# Patient Record
Sex: FEMALE | Race: WHITE | NOT HISPANIC OR LATINO | Employment: FULL TIME | URBAN - NONMETROPOLITAN AREA
[De-identification: names, ages, dates, MRNs, and addresses within clinical notes are randomized per-mention and may not be internally consistent; named-entity substitution may affect disease eponyms.]

---

## 2020-06-12 ENCOUNTER — OFFICE VISIT (OUTPATIENT)
Dept: FAMILY MEDICINE CLINIC | Facility: CLINIC | Age: 50
End: 2020-06-12

## 2020-06-12 VITALS
BODY MASS INDEX: 34.89 KG/M2 | HEART RATE: 95 BPM | DIASTOLIC BLOOD PRESSURE: 108 MMHG | WEIGHT: 204.4 LBS | HEIGHT: 64 IN | RESPIRATION RATE: 18 BRPM | TEMPERATURE: 98.4 F | SYSTOLIC BLOOD PRESSURE: 154 MMHG | OXYGEN SATURATION: 98 %

## 2020-06-12 DIAGNOSIS — E03.8 OTHER SPECIFIED HYPOTHYROIDISM: ICD-10-CM

## 2020-06-12 DIAGNOSIS — R73.9 HYPERGLYCEMIA: ICD-10-CM

## 2020-06-12 DIAGNOSIS — R92.8 ABNORMALITY OF LEFT BREAST ON SCREENING MAMMOGRAM: ICD-10-CM

## 2020-06-12 DIAGNOSIS — E53.8 VITAMIN B12 DEFICIENCY: ICD-10-CM

## 2020-06-12 DIAGNOSIS — Z12.11 SCREEN FOR COLON CANCER: ICD-10-CM

## 2020-06-12 DIAGNOSIS — E78.2 MIXED HYPERLIPIDEMIA: ICD-10-CM

## 2020-06-12 DIAGNOSIS — I10 ESSENTIAL HYPERTENSION: Primary | ICD-10-CM

## 2020-06-12 DIAGNOSIS — F51.01 PRIMARY INSOMNIA: ICD-10-CM

## 2020-06-12 DIAGNOSIS — E55.9 VITAMIN D DEFICIENCY: ICD-10-CM

## 2020-06-12 PROBLEM — Z72.0 TOBACCO ABUSE: Status: ACTIVE | Noted: 2020-06-12

## 2020-06-12 LAB — HBA1C MFR BLD: 5.4 % (ref 3.5–5.6)

## 2020-06-12 PROCEDURE — 83036 HEMOGLOBIN GLYCOSYLATED A1C: CPT | Performed by: NURSE PRACTITIONER

## 2020-06-12 PROCEDURE — 82607 VITAMIN B-12: CPT | Performed by: NURSE PRACTITIONER

## 2020-06-12 PROCEDURE — 84443 ASSAY THYROID STIM HORMONE: CPT | Performed by: NURSE PRACTITIONER

## 2020-06-12 PROCEDURE — 84439 ASSAY OF FREE THYROXINE: CPT | Performed by: NURSE PRACTITIONER

## 2020-06-12 PROCEDURE — 80053 COMPREHEN METABOLIC PANEL: CPT | Performed by: NURSE PRACTITIONER

## 2020-06-12 PROCEDURE — 82306 VITAMIN D 25 HYDROXY: CPT | Performed by: NURSE PRACTITIONER

## 2020-06-12 PROCEDURE — 85025 COMPLETE CBC W/AUTO DIFF WBC: CPT | Performed by: NURSE PRACTITIONER

## 2020-06-12 PROCEDURE — 80061 LIPID PANEL: CPT | Performed by: NURSE PRACTITIONER

## 2020-06-12 PROCEDURE — 99203 OFFICE O/P NEW LOW 30 MIN: CPT | Performed by: NURSE PRACTITIONER

## 2020-06-12 RX ORDER — LANOLIN ALCOHOL/MO/W.PET/CERES
3 CREAM (GRAM) TOPICAL NIGHTLY
Start: 2020-06-12 | End: 2020-12-16

## 2020-06-12 RX ORDER — LISINOPRIL 10 MG/1
10 TABLET ORAL DAILY
Qty: 90 TABLET | Refills: 0 | Status: SHIPPED | OUTPATIENT
Start: 2020-06-12 | End: 2020-07-24

## 2020-06-12 NOTE — PROGRESS NOTES
Chief Complaint   Patient presents with   • Hypertension     Hx of HTN. Went to Optometrist on Mon and BP reading was 154/108 there and HR was 104. Denies CP    • Establish Care   • Insomnia     Trouble staying asleep. Has not tried anything OTC.        Subjective   Karla Farias is a 49 y.o.  female who presents today for     • Hypertension    Hx of HTN. Went to Optometrist on Mon and BP reading was 154/108 there and HR was 104. Denies CP   • Establish Care  • Insomnia    Trouble staying asleep. Has not tried anything OTC.      Karla Farias  has a past medical history of Hyperlipidemia and Hypertension.    No Known Allergies    Current Outpatient Medications:   •  lisinopril (PRINIVIL,ZESTRIL) 10 MG tablet, Take 1 tablet by mouth Daily., Disp: 90 tablet, Rfl: 0  •  melatonin 3 MG tablet, Take 1 tablet by mouth Every Night., Disp: , Rfl:   Past Medical History:   Diagnosis Date   • Hyperlipidemia    • Hypertension      Past Surgical History:   Procedure Laterality Date   •  SECTION      x2   • LASER ABLATION OF THE CERVIX  2019     Social History     Socioeconomic History   • Marital status:      Spouse name: Not on file   • Number of children: Not on file   • Years of education: Not on file   • Highest education level: Not on file   Tobacco Use   • Smoking status: Current Every Day Smoker     Packs/day: 1.00     Last attempt to quit: 1990     Years since quittin.4   • Smokeless tobacco: Never Used   Substance and Sexual Activity   • Alcohol use: Yes     Alcohol/week: 1.0 standard drinks     Types: 1 Cans of beer per week   • Drug use: Never   • Sexual activity: Defer     Family History   Problem Relation Age of Onset   • Brain cancer Mother    • Lung cancer Father    • Hypertension Father    • Heart failure Father    • Emphysema Father    • Diabetes Sister    • Hypertension Maternal Grandmother    • Hyperlipidemia Maternal Grandmother    • Alzheimer's disease Paternal Grandmother    •  "Heart failure Paternal Grandfather      PHQ-2 Depression Screening  Little interest or pleasure in doing things? 0   Feeling down, depressed, or hopeless? 0   PHQ-2 Total Score 0     PHQ-9 Depression Screening  Little interest or pleasure in doing things? 0   Feeling down, depressed, or hopeless? 0   Trouble falling or staying asleep, or sleeping too much?     Feeling tired or having little energy?     Poor appetite or overeating?     Feeling bad about yourself - or that you are a failure or have let yourself or your family down?     Trouble concentrating on things, such as reading the newspaper or watching television?     Moving or speaking so slowly that other people could have noticed? Or the opposite - being so fidgety or restless that you have been moving around a lot more than usual?     Thoughts that you would be better off dead, or of hurting yourself in some way?     PHQ-9 Total Score 0   If you checked off any problems, how difficult have these problems made it for you to do your work, take care of things at home, or get along with other people?         Family history, surgical history, past medical history, Allergies and med's reviewed with patient today and updated in [facility] EMR.     ROS:  Review of Systems   All other systems reviewed and are negative.      OBJECTIVE:  Vitals:    06/12/20 1032   BP: (!) 154/108   BP Location: Left arm   Patient Position: Sitting   Pulse: 95   Resp: 18   Temp: 98.4 °F (36.9 °C)   TempSrc: Oral   SpO2: 98%   Weight: 92.7 kg (204 lb 6.4 oz)   Height: 161.3 cm (63.5\")     Body mass index is 35.64 kg/m².  Physical Exam   Constitutional: She is oriented to person, place, and time. She appears well-developed and well-nourished.   HENT:   Head: Normocephalic and atraumatic.   Eyes: Pupils are equal, round, and reactive to light. Conjunctivae and EOM are normal.   Neck: Normal range of motion. Neck supple.   Cardiovascular: Normal rate, regular rhythm and normal heart sounds. "   Pulmonary/Chest: Effort normal and breath sounds normal.   Abdominal: Soft. Bowel sounds are normal.   Musculoskeletal: Normal range of motion.   Neurological: She is alert and oriented to person, place, and time.   Skin: Skin is warm and dry.   Psychiatric: She has a normal mood and affect. Her behavior is normal. Judgment and thought content normal.   Nursing note and vitals reviewed.      ASSESSMENT/ PLAN:    Karla was seen today for hypertension, establish care and insomnia.    Diagnoses and all orders for this visit:    Essential hypertension  -     lisinopril (PRINIVIL,ZESTRIL) 10 MG tablet; Take 1 tablet by mouth Daily.    Vitamin D deficiency  -     Vitamin D 25 Hydroxy; Future  -     Vitamin D 25 Hydroxy    Vitamin B12 deficiency  -     Vitamin B12; Future  -     Vitamin B12    Mixed hyperlipidemia  -     Comprehensive Metabolic Panel; Future  -     Lipid Panel; Future  -     Comprehensive Metabolic Panel  -     Lipid Panel    Other specified hypothyroidism  -     TSH; Future  -     T4, Free; Future  -     CBC & Differential; Future  -     TSH  -     T4, Free  -     CBC & Differential  -     CBC Auto Differential    Hyperglycemia  -     Hemoglobin A1c; Future  -     Hemoglobin A1c    Abnormality of left breast on screening mammogram  -     US Breast Left Complete; Future    Screen for colon cancer  -     Amb referral for Screening Colonoscopy    Primary insomnia    Other orders  -     melatonin 3 MG tablet; Take 1 tablet by mouth Every Night.        Orders Placed Today:     New Medications Ordered This Visit   Medications   • lisinopril (PRINIVIL,ZESTRIL) 10 MG tablet     Sig: Take 1 tablet by mouth Daily.     Dispense:  90 tablet     Refill:  0   • melatonin 3 MG tablet     Sig: Take 1 tablet by mouth Every Night.        Management Plan:   Start HTN medications  Use melatonin for insomnia     An After Visit Summary was printed and given to the patient at discharge.    Follow-up: Return in about 2 weeks  (around 6/26/2020), or for HTN.    Natalie Castelan, TOSHIA 6/23/2020 09:42  This note was electronically signed.

## 2020-06-13 LAB
25(OH)D3 SERPL-MCNC: 25 NG/ML (ref 30–100)
ALBUMIN SERPL-MCNC: 4 G/DL (ref 3.5–5.2)
ALBUMIN/GLOB SERPL: 1.2 G/DL
ALP SERPL-CCNC: 43 U/L (ref 39–117)
ALT SERPL W P-5'-P-CCNC: 13 U/L (ref 1–33)
ANION GAP SERPL CALCULATED.3IONS-SCNC: 9.9 MMOL/L (ref 5–15)
AST SERPL-CCNC: 18 U/L (ref 1–32)
BASOPHILS # BLD AUTO: 0.04 10*3/MM3 (ref 0–0.2)
BASOPHILS NFR BLD AUTO: 0.5 % (ref 0–1.5)
BILIRUB SERPL-MCNC: 0.2 MG/DL (ref 0.2–1.2)
BUN BLD-MCNC: 8 MG/DL (ref 6–20)
BUN/CREAT SERPL: 14.5 (ref 7–25)
CALCIUM SPEC-SCNC: 8.6 MG/DL (ref 8.6–10.5)
CHLORIDE SERPL-SCNC: 101 MMOL/L (ref 98–107)
CHOLEST SERPL-MCNC: 210 MG/DL (ref 0–200)
CO2 SERPL-SCNC: 24.1 MMOL/L (ref 22–29)
CREAT BLD-MCNC: 0.55 MG/DL (ref 0.57–1)
DEPRECATED RDW RBC AUTO: 41.2 FL (ref 37–54)
EOSINOPHIL # BLD AUTO: 0.22 10*3/MM3 (ref 0–0.4)
EOSINOPHIL NFR BLD AUTO: 2.7 % (ref 0.3–6.2)
ERYTHROCYTE [DISTWIDTH] IN BLOOD BY AUTOMATED COUNT: 13.2 % (ref 12.3–15.4)
GFR SERPL CREATININE-BSD FRML MDRD: 118 ML/MIN/1.73
GLOBULIN UR ELPH-MCNC: 3.3 GM/DL
GLUCOSE BLD-MCNC: 83 MG/DL (ref 65–99)
HCT VFR BLD AUTO: 43.3 % (ref 34–46.6)
HDLC SERPL-MCNC: 49 MG/DL (ref 40–60)
HGB BLD-MCNC: 14.5 G/DL (ref 12–15.9)
IMM GRANULOCYTES # BLD AUTO: 0.03 10*3/MM3 (ref 0–0.05)
IMM GRANULOCYTES NFR BLD AUTO: 0.4 % (ref 0–0.5)
LDLC SERPL CALC-MCNC: 135 MG/DL (ref 0–100)
LDLC/HDLC SERPL: 2.76 {RATIO}
LYMPHOCYTES # BLD AUTO: 2.39 10*3/MM3 (ref 0.7–3.1)
LYMPHOCYTES NFR BLD AUTO: 29.8 % (ref 19.6–45.3)
MCH RBC QN AUTO: 28.7 PG (ref 26.6–33)
MCHC RBC AUTO-ENTMCNC: 33.5 G/DL (ref 31.5–35.7)
MCV RBC AUTO: 85.6 FL (ref 79–97)
MONOCYTES # BLD AUTO: 0.53 10*3/MM3 (ref 0.1–0.9)
MONOCYTES NFR BLD AUTO: 6.6 % (ref 5–12)
NEUTROPHILS # BLD AUTO: 4.82 10*3/MM3 (ref 1.7–7)
NEUTROPHILS NFR BLD AUTO: 60 % (ref 42.7–76)
NRBC BLD AUTO-RTO: 0 /100 WBC (ref 0–0.2)
PLATELET # BLD AUTO: 246 10*3/MM3 (ref 140–450)
PMV BLD AUTO: 11.2 FL (ref 6–12)
POTASSIUM BLD-SCNC: 4.1 MMOL/L (ref 3.5–5.2)
PROT SERPL-MCNC: 7.3 G/DL (ref 6–8.5)
RBC # BLD AUTO: 5.06 10*6/MM3 (ref 3.77–5.28)
SODIUM BLD-SCNC: 135 MMOL/L (ref 136–145)
T4 FREE SERPL-MCNC: 1.2 NG/DL (ref 0.93–1.7)
TRIGL SERPL-MCNC: 130 MG/DL (ref 0–150)
TSH SERPL DL<=0.05 MIU/L-ACNC: 2.79 UIU/ML (ref 0.27–4.2)
VIT B12 BLD-MCNC: 412 PG/ML (ref 211–946)
VLDLC SERPL-MCNC: 26 MG/DL (ref 5–40)
WBC NRBC COR # BLD: 8.03 10*3/MM3 (ref 3.4–10.8)

## 2020-06-24 ENCOUNTER — HOSPITAL ENCOUNTER (OUTPATIENT)
Dept: ULTRASOUND IMAGING | Facility: HOSPITAL | Age: 50
Discharge: HOME | End: 2020-06-24
Admitting: NURSE PRACTITIONER

## 2020-06-24 ENCOUNTER — APPOINTMENT (OUTPATIENT)
Dept: OTHER | Facility: HOSPITAL | Age: 50
End: 2020-06-24

## 2020-06-24 ENCOUNTER — HOSPITAL ENCOUNTER (OUTPATIENT)
Dept: MAMMOGRAPHY | Facility: HOSPITAL | Age: 50
Discharge: HOME | End: 2020-06-24

## 2020-06-24 DIAGNOSIS — N63.20 LEFT BREAST LUMP: ICD-10-CM

## 2020-06-24 DIAGNOSIS — R92.8 ABNORMALITY OF LEFT BREAST ON SCREENING MAMMOGRAM: ICD-10-CM

## 2020-06-24 DIAGNOSIS — Z00.6 EXAMINATION FOR NORMAL COMPARISON OR CONTROL IN CLINICAL RESEARCH: ICD-10-CM

## 2020-06-24 DIAGNOSIS — L30.9 DERMATITIS: Primary | ICD-10-CM

## 2020-06-24 DIAGNOSIS — N63.20 LEFT BREAST LUMP: Primary | ICD-10-CM

## 2020-06-24 PROCEDURE — 77066 DX MAMMO INCL CAD BI: CPT

## 2020-06-24 PROCEDURE — 76642 ULTRASOUND BREAST LIMITED: CPT

## 2020-06-24 PROCEDURE — G0279 TOMOSYNTHESIS, MAMMO: HCPCS

## 2020-06-26 ENCOUNTER — TELEPHONE (OUTPATIENT)
Dept: FAMILY MEDICINE CLINIC | Facility: CLINIC | Age: 50
End: 2020-06-26

## 2020-06-26 ENCOUNTER — OFFICE VISIT (OUTPATIENT)
Dept: FAMILY MEDICINE CLINIC | Facility: CLINIC | Age: 50
End: 2020-06-26

## 2020-06-26 VITALS
WEIGHT: 204 LBS | TEMPERATURE: 96.8 F | BODY MASS INDEX: 34.83 KG/M2 | SYSTOLIC BLOOD PRESSURE: 153 MMHG | RESPIRATION RATE: 16 BRPM | HEART RATE: 86 BPM | HEIGHT: 64 IN | OXYGEN SATURATION: 97 % | DIASTOLIC BLOOD PRESSURE: 100 MMHG

## 2020-06-26 DIAGNOSIS — R07.89 ATYPICAL CHEST PAIN: ICD-10-CM

## 2020-06-26 DIAGNOSIS — I10 ESSENTIAL HYPERTENSION: Primary | ICD-10-CM

## 2020-06-26 PROCEDURE — 93000 ELECTROCARDIOGRAM COMPLETE: CPT | Performed by: NURSE PRACTITIONER

## 2020-06-26 PROCEDURE — 99213 OFFICE O/P EST LOW 20 MIN: CPT | Performed by: NURSE PRACTITIONER

## 2020-06-26 RX ORDER — HYDROCHLOROTHIAZIDE 12.5 MG/1
12.5 TABLET ORAL DAILY
Qty: 30 TABLET | Refills: 0 | Status: SHIPPED | OUTPATIENT
Start: 2020-06-26 | End: 2020-07-24 | Stop reason: SDUPTHER

## 2020-06-26 NOTE — TELEPHONE ENCOUNTER
----- Message from TOSHIA Flood sent at 6/24/2020 11:21 AM EDT -----  1. Referral to dermatology for evaluation  2. . Recommend attempts at obtaining prior outside mammograms for  comparison. If no prior exams become available for comparison, recommend  for the patient to return for a 6 month follow-up left breast diagnostic  mammogram and possible ultrasound for further evaluation.  2. Also recommend continued clinical management of the patient's  improving skin lesion. The patient may also return sooner for further  evaluation if any new or worsening symptoms develop.

## 2020-07-16 ENCOUNTER — TELEPHONE (OUTPATIENT)
Dept: FAMILY MEDICINE CLINIC | Facility: CLINIC | Age: 50
End: 2020-07-16

## 2020-07-16 DIAGNOSIS — N63.20 LEFT BREAST LUMP: Primary | ICD-10-CM

## 2020-07-16 DIAGNOSIS — R92.8 ABNORMAL MAMMOGRAM OF LEFT BREAST: ICD-10-CM

## 2020-07-16 NOTE — TELEPHONE ENCOUNTER
----- Message from TOSHIA Flood sent at 7/13/2020  9:50 AM EDT -----  1. Recommend 6 month follow-up left breast diagnostic mammogram and  possible ultrasound to ensure continued stability and/or benign  progression.  2. Also recommend continued clinical management of the patient's  improving skin lesion. The patient may also return sooner for further  evaluation if any new or worsening symptoms develop.       Does she have a referral for dermatology?

## 2020-07-16 NOTE — TELEPHONE ENCOUNTER
Called pt. No answer. Per HIPPA, may leave detailed VM. VM left advising her of the above and to contact office with questions, concerns, or worsening of symptoms. She does have a referral to Dermatology in her chart. I believe the issue was that Dr. Pappas's office is no longer accepting any Medicaid patients. Therefore, she was referred to [facility]. Future order for Mammo with US if indicated placed.

## 2020-07-24 ENCOUNTER — OFFICE VISIT (OUTPATIENT)
Dept: FAMILY MEDICINE CLINIC | Facility: CLINIC | Age: 50
End: 2020-07-24

## 2020-07-24 VITALS
HEIGHT: 64 IN | TEMPERATURE: 97.7 F | BODY MASS INDEX: 33.77 KG/M2 | DIASTOLIC BLOOD PRESSURE: 77 MMHG | SYSTOLIC BLOOD PRESSURE: 107 MMHG | WEIGHT: 197.8 LBS | OXYGEN SATURATION: 97 % | RESPIRATION RATE: 16 BRPM | HEART RATE: 90 BPM

## 2020-07-24 DIAGNOSIS — I10 ESSENTIAL HYPERTENSION: ICD-10-CM

## 2020-07-24 PROCEDURE — 99213 OFFICE O/P EST LOW 20 MIN: CPT | Performed by: NURSE PRACTITIONER

## 2020-07-24 RX ORDER — LISINOPRIL 10 MG/1
10 TABLET ORAL NIGHTLY
Qty: 90 TABLET | Refills: 0 | Status: CANCELLED | OUTPATIENT
Start: 2020-07-24

## 2020-07-24 RX ORDER — LISINOPRIL 5 MG/1
5 TABLET ORAL DAILY
Qty: 30 TABLET | Refills: 0 | Status: SHIPPED | OUTPATIENT
Start: 2020-07-24 | End: 2020-12-16 | Stop reason: ALTCHOICE

## 2020-07-24 RX ORDER — HYDROCHLOROTHIAZIDE 12.5 MG/1
12.5 TABLET ORAL DAILY
Qty: 90 TABLET | Refills: 0 | Status: SHIPPED | OUTPATIENT
Start: 2020-07-24 | End: 2020-10-27

## 2020-07-24 NOTE — PROGRESS NOTES
Chief Complaint   Patient presents with   • Hypertension     Has not been checking BP at home. Does not have a machine. Denies CP. Denies s/e to medications.         Subjective   Karla Farias is a 49 y.o.  female who presents today for   Reports feeling better.  No elevated blood pressures  Trial of half of a blood pressure pill due to hypotension response  Otherwise doing well     Hypertension   The current episode started more than 1 month ago. Pertinent negatives include no anxiety, blurred vision, headaches, malaise/fatigue, orthopnea, palpitations, PND or shortness of breath. There are no associated agents to hypertension. Risk factors for coronary artery disease include family history and stress. There are no compliance problems.      Karla Farias  has a past medical history of Hyperlipidemia and Hypertension.  Answers for HPI/ROS submitted by the patient on 2020   Hypertension  What is the primary reason for your visit?: High Blood Pressure    No Known Allergies    Current Outpatient Medications:   •  hydroCHLOROthiazide (HYDRODIURIL) 12.5 MG tablet, Take 1 tablet by mouth Daily., Disp: 90 tablet, Rfl: 0  •  melatonin 3 MG tablet, Take 1 tablet by mouth Every Night. (Patient taking differently: Take 6 mg by mouth Every Night.), Disp: , Rfl:   •  lisinopril (PRINIVIL,ZESTRIL) 5 MG tablet, Take 1 tablet by mouth Daily., Disp: 30 tablet, Rfl: 0  Past Medical History:   Diagnosis Date   • Hyperlipidemia    • Hypertension      Past Surgical History:   Procedure Laterality Date   •  SECTION      x2   • LASER ABLATION OF THE CERVIX  2019     Social History     Socioeconomic History   • Marital status:      Spouse name: Not on file   • Number of children: Not on file   • Years of education: Not on file   • Highest education level: Not on file   Tobacco Use   • Smoking status: Current Every Day Smoker     Packs/day: 1.00     Last attempt to quit:      Years since quittin.5   •  Smokeless tobacco: Never Used   Substance and Sexual Activity   • Alcohol use: Yes     Alcohol/week: 1.0 standard drinks     Types: 1 Cans of beer per week   • Drug use: Never   • Sexual activity: Defer     Family History   Problem Relation Age of Onset   • Brain cancer Mother    • Lung cancer Father    • Hypertension Father    • Heart failure Father    • Emphysema Father    • Diabetes Sister    • Hypertension Maternal Grandmother    • Hyperlipidemia Maternal Grandmother    • Alzheimer's disease Paternal Grandmother    • Heart failure Paternal Grandfather      PHQ-2 Depression Screening  Little interest or pleasure in doing things?     Feeling down, depressed, or hopeless?     PHQ-2 Total Score       PHQ-9 Depression Screening  Little interest or pleasure in doing things?     Feeling down, depressed, or hopeless?     Trouble falling or staying asleep, or sleeping too much?     Feeling tired or having little energy?     Poor appetite or overeating?     Feeling bad about yourself - or that you are a failure or have let yourself or your family down?     Trouble concentrating on things, such as reading the newspaper or watching television?     Moving or speaking so slowly that other people could have noticed? Or the opposite - being so fidgety or restless that you have been moving around a lot more than usual?     Thoughts that you would be better off dead, or of hurting yourself in some way?     PHQ-9 Total Score     If you checked off any problems, how difficult have these problems made it for you to do your work, take care of things at home, or get along with other people?         Family history, surgical history, past medical history, Allergies and med's reviewed with patient today and updated in [facility] EMR.     ROS:  Review of Systems   Constitutional: Negative for malaise/fatigue.   Eyes: Negative for blurred vision.   Respiratory: Negative for shortness of breath.    Cardiovascular: Negative for palpitations,  "orthopnea and PND.   Neurological: Negative for headaches.       OBJECTIVE:  Vitals:    07/24/20 0804   BP: 107/77   BP Location: Left arm   Patient Position: Sitting   Cuff Size: Adult   Pulse: 90   Resp: 16   Temp: 97.7 °F (36.5 °C)   TempSrc: Temporal   SpO2: 97%   Weight: 89.7 kg (197 lb 12.8 oz)   Height: 161.3 cm (63.5\")     Body mass index is 34.49 kg/m².  Physical Exam   Constitutional: She is oriented to person, place, and time. She appears well-developed and well-nourished.   HENT:   Head: Normocephalic and atraumatic.   Eyes: Pupils are equal, round, and reactive to light. Conjunctivae and EOM are normal.   Neck: Normal range of motion. Neck supple.   Cardiovascular: Normal rate, regular rhythm and normal heart sounds.   Pulmonary/Chest: Effort normal and breath sounds normal.   Abdominal: Soft. Bowel sounds are normal.   Musculoskeletal: Normal range of motion.   Neurological: She is alert and oriented to person, place, and time.   Skin: Skin is warm and dry.   Psychiatric: She has a normal mood and affect. Her behavior is normal. Judgment and thought content normal.   Nursing note and vitals reviewed.      ASSESSMENT/ PLAN:    Karla was seen today for hypertension.    Diagnoses and all orders for this visit:    Essential hypertension  -     hydroCHLOROthiazide (HYDRODIURIL) 12.5 MG tablet; Take 1 tablet by mouth Daily.    Other orders  -     lisinopril (PRINIVIL,ZESTRIL) 5 MG tablet; Take 1 tablet by mouth Daily.        Orders Placed Today:     New Medications Ordered This Visit   Medications   • hydroCHLOROthiazide (HYDRODIURIL) 12.5 MG tablet     Sig: Take 1 tablet by mouth Daily.     Dispense:  90 tablet     Refill:  0   • lisinopril (PRINIVIL,ZESTRIL) 5 MG tablet     Sig: Take 1 tablet by mouth Daily.     Dispense:  30 tablet     Refill:  0        Management Plan:   Refill sents  F/U in 4 weeks to evaluated blood pressure     An After Visit Summary was printed and given to the patient at " discharge.    Follow-up: Return in about 4 weeks (around 8/21/2020).    Natalie Castelan, APRN 7/27/2020 09:23  This note was electronically signed.

## 2020-08-26 ENCOUNTER — OFFICE VISIT (OUTPATIENT)
Dept: FAMILY MEDICINE CLINIC | Facility: CLINIC | Age: 50
End: 2020-08-26

## 2020-08-26 VITALS
OXYGEN SATURATION: 97 % | HEART RATE: 87 BPM | SYSTOLIC BLOOD PRESSURE: 109 MMHG | TEMPERATURE: 97.3 F | WEIGHT: 193 LBS | BODY MASS INDEX: 32.95 KG/M2 | HEIGHT: 64 IN | DIASTOLIC BLOOD PRESSURE: 73 MMHG

## 2020-08-26 DIAGNOSIS — I10 ESSENTIAL HYPERTENSION: Primary | ICD-10-CM

## 2020-08-26 PROCEDURE — 99213 OFFICE O/P EST LOW 20 MIN: CPT | Performed by: NURSE PRACTITIONER

## 2020-09-10 RX ORDER — ERGOCALCIFEROL 1.25 MG/1
50000 CAPSULE ORAL
Qty: 8 CAPSULE | Refills: 0 | Status: SHIPPED | OUTPATIENT
Start: 2020-09-10 | End: 2020-10-30

## 2020-10-12 DIAGNOSIS — E55.9 VITAMIN D DEFICIENCY: ICD-10-CM

## 2020-10-12 DIAGNOSIS — I10 ESSENTIAL HYPERTENSION: Primary | ICD-10-CM

## 2020-10-12 DIAGNOSIS — E53.8 VITAMIN B12 DEFICIENCY: ICD-10-CM

## 2020-10-12 DIAGNOSIS — E03.8 OTHER SPECIFIED HYPOTHYROIDISM: ICD-10-CM

## 2020-10-12 DIAGNOSIS — E78.2 MIXED HYPERLIPIDEMIA: ICD-10-CM

## 2020-10-12 DIAGNOSIS — R53.83 OTHER FATIGUE: ICD-10-CM

## 2020-10-12 DIAGNOSIS — R73.9 HYPERGLYCEMIA: ICD-10-CM

## 2020-10-27 DIAGNOSIS — I10 ESSENTIAL HYPERTENSION: ICD-10-CM

## 2020-10-27 RX ORDER — HYDROCHLOROTHIAZIDE 12.5 MG/1
TABLET ORAL
Qty: 90 TABLET | Refills: 0 | Status: SHIPPED | OUTPATIENT
Start: 2020-10-27 | End: 2021-02-16 | Stop reason: SDUPTHER

## 2020-10-27 NOTE — TELEPHONE ENCOUNTER
Date of last refill: 7-    Is there an Inspect on file: n/a    Last appt date: 8-     Next appt date: 12-      Additional information:

## 2020-11-16 ENCOUNTER — TELEPHONE (OUTPATIENT)
Dept: FAMILY MEDICINE CLINIC | Facility: CLINIC | Age: 50
End: 2020-11-16

## 2020-11-16 NOTE — TELEPHONE ENCOUNTER
CALLED PATIENT. NO ANSWER. PER HIPAA, MAY LEAVE VM ADVISING PT TO SEEK APPT WITH EYE DR PER RECOMMENDATION PER NP. ADVISED PT TO CONTACT US WITH ANY OTHER CONCERNS OR QUESTIONS.

## 2020-11-16 NOTE — TELEPHONE ENCOUNTER
Patient called stating that her right eye is swollen and having a slight discharge,  This has been ongoing since Friday 11/13.  She is asking if she should come to see us or go to her eye doctor.

## 2020-11-16 NOTE — TELEPHONE ENCOUNTER
Natalie,   Do you want patient to go see an eye doctor or would you like to see the patient? Please advise. Thank you.    visual

## 2020-12-16 ENCOUNTER — OFFICE VISIT (OUTPATIENT)
Dept: FAMILY MEDICINE CLINIC | Facility: CLINIC | Age: 50
End: 2020-12-16

## 2020-12-16 VITALS
DIASTOLIC BLOOD PRESSURE: 85 MMHG | OXYGEN SATURATION: 97 % | RESPIRATION RATE: 18 BRPM | TEMPERATURE: 97.2 F | WEIGHT: 201.6 LBS | BODY MASS INDEX: 34.42 KG/M2 | SYSTOLIC BLOOD PRESSURE: 123 MMHG | HEART RATE: 95 BPM | HEIGHT: 64 IN

## 2020-12-16 DIAGNOSIS — M54.41 ACUTE BILATERAL LOW BACK PAIN WITH RIGHT-SIDED SCIATICA: Primary | ICD-10-CM

## 2020-12-16 DIAGNOSIS — R20.2 NUMBNESS AND TINGLING OF BOTH LEGS: ICD-10-CM

## 2020-12-16 DIAGNOSIS — R20.0 NUMBNESS AND TINGLING OF BOTH LEGS: ICD-10-CM

## 2020-12-16 PROCEDURE — 99213 OFFICE O/P EST LOW 20 MIN: CPT | Performed by: NURSE PRACTITIONER

## 2020-12-16 RX ORDER — IBUPROFEN 800 MG/1
800 TABLET ORAL EVERY 8 HOURS PRN
Qty: 28 TABLET | Refills: 0 | Status: SHIPPED | OUTPATIENT
Start: 2020-12-16 | End: 2020-12-30 | Stop reason: ALTCHOICE

## 2020-12-16 NOTE — PROGRESS NOTES
"Chief Complaint   Patient presents with   • Back Pain     follow up    • Tingling     associated with numbness in bilateral thighs for a while.         Subjective   Karla Farias is a 50 y.o.  female who presents today for   Pt  Reports bilateral upper leg tingling on an off for \"few months\" it is now constant. She reports that she did see a chiropractor when she was younger that really helped her. Reports as though it always feels like its asleep.     Back Pain  This is a new problem. The current episode started 1 to 4 weeks ago. The problem occurs daily. The problem has been gradually worsening since onset. The pain is present in the gluteal. The quality of the pain is described as aching, burning, shooting and stabbing. The pain radiates to the right foot and right thigh. The pain is at a severity of 8/10. The pain is worse during the day. The symptoms are aggravated by sitting and standing. Stiffness is present at night. Associated symptoms include leg pain, numbness, paresthesias and tingling. Pertinent negatives include no abdominal pain, bladder incontinence, bowel incontinence, chest pain, dysuria, fever, headaches, paresis, pelvic pain, perianal numbness or weakness.     Karla Farias  has a past medical history of Hyperlipidemia and Hypertension.    No Known Allergies    Current Outpatient Medications:   •  hydroCHLOROthiazide (HYDRODIURIL) 12.5 MG tablet, Take 1 tablet by mouth once daily, Disp: 90 tablet, Rfl: 0  •  ibuprofen (ADVIL,MOTRIN) 800 MG tablet, Take 1 tablet by mouth Every 8 (Eight) Hours As Needed for Mild Pain  for up to 14 days., Disp: 28 tablet, Rfl: 0  Past Medical History:   Diagnosis Date   • Hyperlipidemia    • Hypertension      Past Surgical History:   Procedure Laterality Date   •  SECTION      x2   • LASER ABLATION OF THE CERVIX  2019     Social History     Socioeconomic History   • Marital status:      Spouse name: Not on file   • Number of children: Not on " file   • Years of education: Not on file   • Highest education level: Not on file   Tobacco Use   • Smoking status: Current Every Day Smoker     Packs/day: 1.00     Years: 30.00     Pack years: 30.00     Start date: 1990   • Smokeless tobacco: Never Used   Substance and Sexual Activity   • Alcohol use: Yes     Alcohol/week: 1.0 standard drinks     Types: 1 Cans of beer per week   • Drug use: Never   • Sexual activity: Defer     Family History   Problem Relation Age of Onset   • Brain cancer Mother    • Lung cancer Father    • Hypertension Father    • Heart failure Father    • Emphysema Father    • Diabetes Sister    • Hypertension Maternal Grandmother    • Hyperlipidemia Maternal Grandmother    • Alzheimer's disease Paternal Grandmother    • Heart failure Paternal Grandfather      PHQ-2 Depression Screening  Little interest or pleasure in doing things?     Feeling down, depressed, or hopeless?     PHQ-2 Total Score       PHQ-9 Depression Screening  Little interest or pleasure in doing things?     Feeling down, depressed, or hopeless?     Trouble falling or staying asleep, or sleeping too much?     Feeling tired or having little energy?     Poor appetite or overeating?     Feeling bad about yourself - or that you are a failure or have let yourself or your family down?     Trouble concentrating on things, such as reading the newspaper or watching television?     Moving or speaking so slowly that other people could have noticed? Or the opposite - being so fidgety or restless that you have been moving around a lot more than usual?     Thoughts that you would be better off dead, or of hurting yourself in some way?     PHQ-9 Total Score     If you checked off any problems, how difficult have these problems made it for you to do your work, take care of things at home, or get along with other people?         Family history, surgical history, past medical history, Allergies and med's reviewed with patient today and updated  "in [facility] EMR.     ROS:  Review of Systems   Constitutional: Negative for fever.   Cardiovascular: Negative for chest pain.   Gastrointestinal: Negative for abdominal pain and bowel incontinence.   Genitourinary: Negative for bladder incontinence, dysuria and pelvic pain.   Musculoskeletal: Positive for back pain.   Neurological: Positive for tingling, numbness and paresthesias. Negative for weakness and headaches.       OBJECTIVE:  Vitals:    12/16/20 0952   BP: 123/85   BP Location: Left arm   Patient Position: Sitting   Cuff Size: Adult   Pulse: 95   Resp: 18   Temp: 97.2 °F (36.2 °C)   TempSrc: Infrared   SpO2: 97%   Weight: 91.4 kg (201 lb 9.6 oz)   Height: 161.3 cm (63.5\")     Body mass index is 35.15 kg/m².  Physical Exam  Vitals signs and nursing note reviewed.   Constitutional:       Appearance: She is well-developed.   HENT:      Head: Normocephalic and atraumatic.   Eyes:      Conjunctiva/sclera: Conjunctivae normal.      Pupils: Pupils are equal, round, and reactive to light.   Neck:      Musculoskeletal: Normal range of motion and neck supple.   Cardiovascular:      Rate and Rhythm: Normal rate and regular rhythm.      Heart sounds: Normal heart sounds.   Pulmonary:      Effort: Pulmonary effort is normal.      Breath sounds: Normal breath sounds.   Abdominal:      General: Bowel sounds are normal.      Palpations: Abdomen is soft.   Musculoskeletal: Normal range of motion.   Skin:     General: Skin is warm and dry.   Neurological:      Mental Status: She is alert and oriented to person, place, and time.   Psychiatric:         Behavior: Behavior normal.         Thought Content: Thought content normal.         Judgment: Judgment normal.         ASSESSMENT/ PLAN:    Diagnoses and all orders for this visit:    1. Acute bilateral low back pain with right-sided sciatica (Primary)  -     ibuprofen (ADVIL,MOTRIN) 800 MG tablet; Take 1 tablet by mouth Every 8 (Eight) Hours As Needed for Mild Pain  for up to 14 " days.  Dispense: 28 tablet; Refill: 0  -     XR Spine Lumbar 2 or 3 View; Future  -     EMG 2 Limbs; Future    2. Numbness and tingling of both legs        Orders Placed Today:     New Medications Ordered This Visit   Medications   • ibuprofen (ADVIL,MOTRIN) 800 MG tablet     Sig: Take 1 tablet by mouth Every 8 (Eight) Hours As Needed for Mild Pain  for up to 14 days.     Dispense:  28 tablet     Refill:  0        Management Plan:   Needs EMG  Trial of ibuprofen for low back pain  Lumbar spine xray      An After Visit Summary was printed and given to the patient at discharge.    Follow-up: Return in about 2 weeks (around 12/30/2020).    TOSHIA Kennedy 12/17/2020 10:18 EST  This note was electronically signed.      Answers for HPI/ROS submitted by the patient on 12/16/2020   Back pain  What is the primary reason for your visit?: Back Pain

## 2020-12-21 ENCOUNTER — TELEPHONE (OUTPATIENT)
Dept: FAMILY MEDICINE CLINIC | Facility: CLINIC | Age: 50
End: 2020-12-21

## 2020-12-21 ENCOUNTER — HOSPITAL ENCOUNTER (OUTPATIENT)
Dept: MAMMOGRAPHY | Facility: HOSPITAL | Age: 50
Discharge: HOME | End: 2020-12-21

## 2020-12-21 ENCOUNTER — HOSPITAL ENCOUNTER (OUTPATIENT)
Dept: GENERAL RADIOLOGY | Facility: HOSPITAL | Age: 50
Discharge: HOME | End: 2020-12-21

## 2020-12-21 ENCOUNTER — HOSPITAL ENCOUNTER (OUTPATIENT)
Dept: ULTRASOUND IMAGING | Facility: HOSPITAL | Age: 50
Discharge: HOME | End: 2020-12-21

## 2020-12-21 DIAGNOSIS — R92.8 ABNORMAL MAMMOGRAM OF LEFT BREAST: ICD-10-CM

## 2020-12-21 DIAGNOSIS — R20.0 NUMBNESS AND TINGLING OF BOTH LEGS: Primary | ICD-10-CM

## 2020-12-21 DIAGNOSIS — N63.20 BREAST MASS, LEFT: ICD-10-CM

## 2020-12-21 DIAGNOSIS — R20.2 NUMBNESS AND TINGLING OF BOTH LEGS: Primary | ICD-10-CM

## 2020-12-21 DIAGNOSIS — M54.41 ACUTE BILATERAL LOW BACK PAIN WITH RIGHT-SIDED SCIATICA: ICD-10-CM

## 2020-12-21 DIAGNOSIS — N63.20 LEFT BREAST LUMP: ICD-10-CM

## 2020-12-21 PROCEDURE — 77065 DX MAMMO INCL CAD UNI: CPT

## 2020-12-21 PROCEDURE — G0279 TOMOSYNTHESIS, MAMMO: HCPCS

## 2020-12-21 PROCEDURE — 72100 X-RAY EXAM L-S SPINE 2/3 VWS: CPT

## 2020-12-21 RX ORDER — MELOXICAM 7.5 MG/1
7.5 TABLET ORAL DAILY
Qty: 30 TABLET | Refills: 0 | Status: SHIPPED | OUTPATIENT
Start: 2020-12-21 | End: 2021-01-03 | Stop reason: ALTCHOICE

## 2020-12-21 NOTE — PROGRESS NOTES
I SPOKE WITH PATIENT AND SHE VOICED UNDERSTANDING,  SHE DID ASK IF THERE IS SOMETHING THAT SHE MAY TAKE FOR THE PAIN SHE FEELS. IBUPROFEN DOES NOT SEEM TO BE WORKING.

## 2020-12-21 NOTE — TELEPHONE ENCOUNTER
----- Message from TOSHIA Flood sent at 12/21/2020  2:28 PM EST -----  Let her know to stop the Ibuprofen and I sent in Mobic to see if this is better effective  Please update me in two weeks

## 2020-12-30 ENCOUNTER — OFFICE VISIT (OUTPATIENT)
Dept: FAMILY MEDICINE CLINIC | Facility: CLINIC | Age: 50
End: 2020-12-30

## 2020-12-30 VITALS
DIASTOLIC BLOOD PRESSURE: 90 MMHG | OXYGEN SATURATION: 96 % | HEART RATE: 90 BPM | TEMPERATURE: 98 F | BODY MASS INDEX: 34.49 KG/M2 | WEIGHT: 202 LBS | SYSTOLIC BLOOD PRESSURE: 139 MMHG | HEIGHT: 64 IN

## 2020-12-30 DIAGNOSIS — R29.898 WEAKNESS OF BOTH LOWER EXTREMITIES: ICD-10-CM

## 2020-12-30 DIAGNOSIS — R20.0 NUMBNESS AND TINGLING OF BOTH LEGS: Primary | ICD-10-CM

## 2020-12-30 DIAGNOSIS — R20.2 NUMBNESS AND TINGLING OF BOTH LEGS: Primary | ICD-10-CM

## 2020-12-30 PROCEDURE — 99213 OFFICE O/P EST LOW 20 MIN: CPT | Performed by: NURSE PRACTITIONER

## 2021-01-12 ENCOUNTER — PROCEDURE VISIT (OUTPATIENT)
Dept: NEUROLOGY | Facility: CLINIC | Age: 51
End: 2021-01-12

## 2021-01-12 VITALS — TEMPERATURE: 97.1 F

## 2021-01-12 DIAGNOSIS — M54.41 ACUTE BILATERAL LOW BACK PAIN WITH RIGHT-SIDED SCIATICA: ICD-10-CM

## 2021-01-12 PROCEDURE — 95908 NRV CNDJ TST 3-4 STUDIES: CPT | Performed by: PSYCHIATRY & NEUROLOGY

## 2021-01-12 PROCEDURE — 95886 MUSC TEST DONE W/N TEST COMP: CPT | Performed by: PSYCHIATRY & NEUROLOGY

## 2021-01-12 NOTE — PROGRESS NOTES
EMG and Nerve Conduction Studies    The complete report includes the data sheets.     Referring Doctor: Natalie Castelan APRN    History: BLE/Numbness and Tingling of both legs    Results:    1.  The right sural sensory study was essentially normal.    2.  The left peroneal and bilateral tibial motor nerve conduction studies were normal.    3.  EMG of the muscles examined in the bilateral  L3-S1 myotomes were normal.    Impression:    This is a normal EMG nerve conduction study of the lower extremities.  There is no evidence of significant neuropathy affecting the large diameter fast conducting fibers and no signs of neurogenic change in the muscles examined in the L3-S1 myotomes    Joseph Seipel, M.D.

## 2021-01-15 NOTE — PROGRESS NOTES
Please advise her this was an essentially normal exam  Recommend MRI of lumbar spine if negative then thoracic spine

## 2021-01-18 ENCOUNTER — HOSPITAL ENCOUNTER (OUTPATIENT)
Dept: MRI IMAGING | Facility: HOSPITAL | Age: 51
Discharge: HOME | End: 2021-01-18
Admitting: NURSE PRACTITIONER

## 2021-01-18 ENCOUNTER — TELEPHONE (OUTPATIENT)
Dept: FAMILY MEDICINE CLINIC | Facility: CLINIC | Age: 51
End: 2021-01-18

## 2021-01-18 DIAGNOSIS — R20.2 NUMBNESS AND TINGLING OF BOTH LEGS: ICD-10-CM

## 2021-01-18 DIAGNOSIS — M54.41 ACUTE BILATERAL LOW BACK PAIN WITH RIGHT-SIDED SCIATICA: Primary | ICD-10-CM

## 2021-01-18 DIAGNOSIS — R20.0 NUMBNESS AND TINGLING OF BOTH LEGS: ICD-10-CM

## 2021-01-18 PROCEDURE — 72148 MRI LUMBAR SPINE W/O DYE: CPT

## 2021-01-18 NOTE — TELEPHONE ENCOUNTER
CALLED PATIENT. PATIENT'S IDENTITY VERIFIED. ADVISED PT OF EMG AND MRI RESULTS, AS WELL AS NEED/RECOMMENDATAION TO SEE NEUROSURGEON. SHE VOICED UNDERSTANDING. SHE IS AWARE THAT THE NEUROSURGEON PRACTICE WILL CONTACT HER TO SCHEDULE. REFERRAL PLACED.   
The EMG is normal  The MRI is showing degenerative changes she needs referred to neurosurgeon for recommendations    
WHEN I CALLED AND S/W THIS PATIENT RE: HER DAUGHTER'S TEST RESULTS, THIS PATIENT IS WONDERING ABOUT HER EMG RESULTS, AND STATES THAT SHE ALSO HAD HER MRI TODAY AS WELL. SHE IS WANTING RESULTS OF THE EMG AND MRI. PLEASE ADVISE.     THANK YOU.   
Patient information on fall and injury prevention

## 2021-01-27 ENCOUNTER — OFFICE VISIT (OUTPATIENT)
Dept: NEUROSURGERY | Facility: CLINIC | Age: 51
End: 2021-01-27

## 2021-01-27 VITALS
HEIGHT: 62 IN | BODY MASS INDEX: 36.99 KG/M2 | WEIGHT: 201 LBS | DIASTOLIC BLOOD PRESSURE: 101 MMHG | HEART RATE: 96 BPM | SYSTOLIC BLOOD PRESSURE: 151 MMHG

## 2021-01-27 DIAGNOSIS — M53.3 SACRO-ILIAC PAIN: Primary | ICD-10-CM

## 2021-01-27 PROCEDURE — 99203 OFFICE O/P NEW LOW 30 MIN: CPT | Performed by: NEUROLOGICAL SURGERY

## 2021-01-27 RX ORDER — TIZANIDINE 2 MG/1
4 TABLET ORAL EVERY 8 HOURS PRN
Qty: 40 TABLET | Refills: 2 | Status: SHIPPED | OUTPATIENT
Start: 2021-01-27 | End: 2021-08-04 | Stop reason: SDUPTHER

## 2021-01-27 RX ORDER — GABAPENTIN 100 MG/1
100 CAPSULE ORAL 2 TIMES DAILY
Qty: 60 CAPSULE | Refills: 3 | Status: SHIPPED | OUTPATIENT
Start: 2021-01-27 | End: 2021-07-07 | Stop reason: SDUPTHER

## 2021-01-27 NOTE — PROGRESS NOTES
Subjective   History of Present Illness: Karla Farias is a 50 y.o. female who was seen today for back pain and some anterior thigh paresthesias    History Of Present Illness: Rosario is a 50-year-old female who developed problems with back pain particularly over the sacroiliac joints and gluteal area.  She also has numbness and tingling in the anterior thighs that appears to be either an L2 and 3 distribution or along the femoral nerve.  She did undergo MRI scanning lumbar spine that was unremarkable.  Unfortunately sacroiliac joints are not well seen on this.    She has not really tried much other than ibuprofen initially then Mobic but both of them gave her gastric upset and she has not stopped these.    The following portions of the patient's history were reviewed and updated as appropriate: allergies, current medications, past family history, past medical history, past social history, past surgical history, and problem list.    Past Medical History:   Diagnosis Date   • Arthritis    • Hyperlipidemia    • Hypertension         Past Surgical History:   Procedure Laterality Date   •  SECTION      x2   • LASER ABLATION OF THE CERVIX  2019          Current Outpatient Medications:   •  hydroCHLOROthiazide (HYDRODIURIL) 12.5 MG tablet, Take 1 tablet by mouth once daily, Disp: 90 tablet, Rfl: 0     Social History     Socioeconomic History   • Marital status:      Spouse name: Not on file   • Number of children: Not on file   • Years of education: Not on file   • Highest education level: Not on file   Tobacco Use   • Smoking status: Current Every Day Smoker     Packs/day: 1.00     Years: 30.00     Pack years: 30.00     Start date:    • Smokeless tobacco: Never Used   Substance and Sexual Activity   • Alcohol use: Yes     Alcohol/week: 1.0 standard drinks     Types: 1 Cans of beer per week   • Drug use: Never   • Sexual activity: Defer        Family History   Problem Relation Age of Onset   • Brain  "cancer Mother    • Lung cancer Father    • Hypertension Father    • Heart failure Father    • Emphysema Father    • Diabetes Sister    • Hypertension Maternal Grandmother    • Hyperlipidemia Maternal Grandmother    • Alzheimer's disease Paternal Grandmother    • Heart failure Paternal Grandfather         Review of Systems   Constitutional: Negative.    Musculoskeletal: Positive for back pain and myalgias.   Neurological: Positive for numbness.       Objective     Vitals:    01/27/21 1035   BP: (!) 151/101   BP Location: Left arm   Patient Position: Sitting   Cuff Size: Adult   Pulse: 96   Weight: 91.2 kg (201 lb)   Height: 157.5 cm (62\")     Body mass index is 36.76 kg/m².      Physical Exam  Neurological:      Mental Status: She is oriented to person, place, and time.      Motor: Motor function is intact.      Coordination: Coordination is intact.      Gait: Gait is intact.      Deep Tendon Reflexes:      Reflex Scores:       Patellar reflexes are 2+ on the right side and 2+ on the left side.       Achilles reflexes are 1+ on the right side and 1+ on the left side.     Comments: Geo's test is positive on the right and questionably positive on the left.  Knee thrust also reproduces pain greater on the right than the left.   Psychiatric:         Speech: Speech normal.       Neurologic Exam     Mental Status   Oriented to person, place, and time.   Attention: normal. Concentration: normal.   Speech: speech is normal   Level of consciousness: alert  Knowledge: good.     Gait, Coordination, and Reflexes     Gait  Gait: normal    Reflexes   Right patellar: 2+  Left patellar: 2+  Right achilles: 1+  Left achilles: 1+          Assessment/Plan   Independent Review of Radiographic Studies:      I personally reviewed the images from the following studies.    Lumbar MRI scan dated January 18, 2021.    Medical Decision Making:      After reviewing the scan and her symptoms I think she probably has sacroiliac dysfunction " causing the back pain.  She is can start a physical therapy course for that and we will put her on tizanidine as a muscle relaxer and Neurontin for the tingling paresthesias in the anterior thigh.  She does seem to be somewhat intolerant of the anti-inflammatory medications.  She will return in 4 to 6 weeks for follow-up.  There are no diagnoses linked to this encounter.  No follow-ups on file.

## 2021-02-16 DIAGNOSIS — I10 ESSENTIAL HYPERTENSION: ICD-10-CM

## 2021-02-16 RX ORDER — HYDROCHLOROTHIAZIDE 12.5 MG/1
12.5 TABLET ORAL DAILY
Qty: 90 TABLET | Refills: 0 | Status: SHIPPED | OUTPATIENT
Start: 2021-02-16 | End: 2021-06-16

## 2021-02-16 NOTE — TELEPHONE ENCOUNTER
Date of last refill:10/20/2020    Is there an Inspect on file:N/A    Last appt date:12/30/2020     Next appt date:N/A      Additional information:

## 2021-02-19 ENCOUNTER — TREATMENT (OUTPATIENT)
Dept: PHYSICAL THERAPY | Facility: CLINIC | Age: 51
End: 2021-02-19

## 2021-02-19 DIAGNOSIS — M53.3 SACROILIAC JOINT DYSFUNCTION: Primary | ICD-10-CM

## 2021-02-19 PROCEDURE — 97162 PT EVAL MOD COMPLEX 30 MIN: CPT | Performed by: PHYSICAL THERAPIST

## 2021-02-19 PROCEDURE — 97110 THERAPEUTIC EXERCISES: CPT | Performed by: PHYSICAL THERAPIST

## 2021-02-19 NOTE — PROGRESS NOTES
Physical Therapy Initial Evaluation and Plan of Care    Patient: Karla Farias   : 1970  Diagnosis/ICD-10 Code:  Sacroiliac joint dysfunction [M53.3]  Referring practitioner: Uriah Melissa MD  Date of Initial Visit: 2021  Today's Date: 2021  Patient seen for 1 sessions           Subjective Questionnaire: Oswestry: 24/50 ( 48%)        Subjective Evaluation    History of Present Illness  Mechanism of injury: Pt is referred to therapy with Dx of Sacroiliac joint dysfunction. Pt reports she has been having back pain for a long time but it has been worse in the past few months. Co R sided pain which radiates to the L side /across the LB and sometimes pain and tightness ant thighs. Occasional tingling sensation in R side and ant thighs.   Inc pain with prolonged sitting/ standing/ bending over. It wakes her up at night if sleep on L side. Has to move frequently.   She has been off work since Monday and she feels somewhat better. 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      Quality of life: good    Pain  Current pain ratin  At best pain ratin  At worst pain rating: 10  Quality: throbbing, tight and pulling  Relieving factors: change in position and medications  Aggravating factors: lifting, prolonged positioning, ambulation, standing, movement, sleeping and repetitive movement  Progression: worsening    Social Support  Lives with: spouse    Treatments  Current treatment: medication  Patient Goals  Patient goals for therapy: decreased pain and increased motion           Precautions:     Objective          Postural Observations  Seated posture: fair  Standing posture: fair  Correction of posture: has no consistent effect        Tenderness     Right Hip   Tenderness in the PSIS.     Active Range of Motion     Additional Active Range of Motion Details  Standing lumbar flex: wfl, inc pain R side and across LB, better  with reps  Standing lumbar ext: wfl , no inc pain , no change with reps, co  mild tingling sensation ant upper thighs  Supine flex: wfl,  Inc pain R side initially but got better with reps  Prone ext: wfl limitation, inc pain in LB and tingling sensation ant upper thighs with reps    LEs strength: 4/5 for all ms groups    Supine Manual traction: no change , but R LE long axial traction makes symptoms better    SLR: pos for pain in LB and legs B           Assessment & Plan     Assessment  Impairments: abnormal or restricted ROM, activity intolerance, lacks appropriate home exercise program and pain with function  Assessment details: Pt is a 49 y/o f referred to therapy due to R sided LBP. Pt presents with impaired spinal mobility, TTP R SIJ, tingling sensation ant upper thighs and dec tolerance to standing/ sitting / and disturbed sleep. Upon initial evaluation pt exhibits the above impairments and functional limitations. Impairments affect sleeping, driving and performing daily activities. Pt will benefit from skilled physical therapy to address impairments, resolve pain and maximize function.   Functional Limitations: lifting, sleeping, walking, uncomfortable because of pain, sitting, standing and unable to perform repetitive tasks  Goals  Plan Goals: STGs:  In 3 weeks  1- Pt will  report at least 35 % improvement and pain reduction   2- Pt will be independent with initial HEP   3- Pt will tolerate progression of HEP and her exercise program     LTGs: By DC   1- Pt will report at least 75 % improvement and pain reduction   2- Pt will be independent with final HEP and self management of her condition   3- Pt will improve Oswestry score compare to initial score at eval indicating functional improvement   4- Pt will voice readiness for DC with independent program   5- Pt will demonstrate improved posture and body mechanics    Plan  Therapy options: will be seen for skilled physical therapy services  Planned modality interventions: high voltage pulsed current (pain management) and  ultrasound  Planned therapy interventions: abdominal trunk stabilization, body mechanics training, functional ROM exercises, home exercise program, manual therapy, neuromuscular re-education, postural training, strengthening and therapeutic activities  Frequency: 2x week  Treatment plan discussed with: patient  Plan details: 20 visits        See flow sheet for treatment detail    History # of Personal Factors and/or Comorbidities: MODERATE (1-2)  Examination of Body System(s): # of elements: MODERATE (3)  Clinical Presentation: EVOLVING  Clinical Decision Making: MODERATE          Timed:         Manual Therapy:         mins  49588;     Therapeutic Exercise:    10     mins  89350;     Neuromuscular Arturo:        mins  16994;    Therapeutic Activity:    10      mins  13165;     Gait Training:           mins  29237;     Ultrasound:          mins  86762;    Ionto                                   mins   64588  Self Care                            mins   40194  Canal repositioning           mins    24286      Un-Timed:  Electrical Stimulation:         mins  79079 ( );  Dry Needling          mins self-pay  Traction          mins 42978  Low Eval          Mins  46928  Mod Eval      25    Mins  46812  High Eval                            Mins  94833  Re-Eval                               mins  72032        Timed Treatment:  20    mins   Total Treatment:    45    mins    PT SIGNATURE: Davidson Palencia PT, CLT   DATE TREATMENT INITIATED: 2/19/2021    Initial Certification  Certification Period: 5/20/2021  I certify that the therapy services are furnished while this patient is under my care.  The services outlined above are required by this patient, and will be reviewed every 90 days.     PHYSICIAN: Uriah Melissa MD      DATE:     Please sign and return via fax to [phone].. Thank you, [facility].

## 2021-04-20 ENCOUNTER — DOCUMENTATION (OUTPATIENT)
Dept: PHYSICAL THERAPY | Facility: CLINIC | Age: 51
End: 2021-04-20

## 2021-04-20 NOTE — PROGRESS NOTES
Discharge Summary  Discharge Summary from Physical/Occupational Therapy Report    Patient: Karla Farias   : 1970  Today's Date: 2021    Patient seen for 1 visit  Dates of Service: 21      Comments : Pt was evaluated but has not returned for additional therapy and will be DC from our services.       Thank you for this referral to [facility].    SIGNATURE: Davidson Palencia PT

## 2021-05-25 ENCOUNTER — HOSPITAL ENCOUNTER (OUTPATIENT)
Dept: MAMMOGRAPHY | Facility: HOSPITAL | Age: 51
Discharge: HOME | End: 2021-05-25

## 2021-05-25 ENCOUNTER — HOSPITAL ENCOUNTER (OUTPATIENT)
Dept: ULTRASOUND IMAGING | Facility: HOSPITAL | Age: 51
End: 2021-05-25

## 2021-05-25 DIAGNOSIS — N63.20 BREAST MASS, LEFT: ICD-10-CM

## 2021-06-15 DIAGNOSIS — I10 ESSENTIAL HYPERTENSION: ICD-10-CM

## 2021-06-16 RX ORDER — HYDROCHLOROTHIAZIDE 12.5 MG/1
TABLET ORAL
Qty: 30 TABLET | Refills: 0 | Status: SHIPPED | OUTPATIENT
Start: 2021-06-16 | End: 2021-08-05

## 2021-06-16 NOTE — TELEPHONE ENCOUNTER
Date of last refill: 2-    Is there an Inspect on file: N/A     Last appt date: 12-     Next appt date: 06/22/2021      Additional information:  CINDY MENDOZA

## 2021-06-22 ENCOUNTER — HOSPITAL ENCOUNTER (OUTPATIENT)
Dept: MAMMOGRAPHY | Facility: HOSPITAL | Age: 51
Discharge: HOME | End: 2021-06-22
Admitting: NURSE PRACTITIONER

## 2021-06-22 ENCOUNTER — HOSPITAL ENCOUNTER (OUTPATIENT)
Dept: ULTRASOUND IMAGING | Facility: HOSPITAL | Age: 51
End: 2021-06-22

## 2021-06-22 DIAGNOSIS — N63.20 BREAST MASS, LEFT: ICD-10-CM

## 2021-06-22 PROCEDURE — G0279 TOMOSYNTHESIS, MAMMO: HCPCS

## 2021-06-22 PROCEDURE — 77066 DX MAMMO INCL CAD BI: CPT

## 2021-06-23 ENCOUNTER — TELEPHONE (OUTPATIENT)
Dept: FAMILY MEDICINE CLINIC | Facility: CLINIC | Age: 51
End: 2021-06-23

## 2021-06-23 ENCOUNTER — OFFICE VISIT (OUTPATIENT)
Dept: FAMILY MEDICINE CLINIC | Facility: CLINIC | Age: 51
End: 2021-06-23

## 2021-06-23 VITALS
BODY MASS INDEX: 36.66 KG/M2 | HEART RATE: 80 BPM | WEIGHT: 199.2 LBS | TEMPERATURE: 97.5 F | DIASTOLIC BLOOD PRESSURE: 85 MMHG | SYSTOLIC BLOOD PRESSURE: 123 MMHG | HEIGHT: 62 IN | RESPIRATION RATE: 16 BRPM | OXYGEN SATURATION: 97 %

## 2021-06-23 DIAGNOSIS — M79.662 PAIN IN LEFT LOWER LEG: Primary | ICD-10-CM

## 2021-06-23 DIAGNOSIS — I73.9 INTERMITTENT CLAUDICATION (HCC): ICD-10-CM

## 2021-06-23 PROCEDURE — 99213 OFFICE O/P EST LOW 20 MIN: CPT | Performed by: NURSE PRACTITIONER

## 2021-06-23 NOTE — PROGRESS NOTES
Chief Complaint   Patient presents with   • Hypertension   • Leg Pain     (L) SIDE ANTERIOR AND LATERAL THIGH PAIN - FEELS LIKE A STABBING PAIN - HAS BEEN WAKING HER UP IN THE MIDDLE OF THE NIGHT.         Subjective   Karla Farias is a 50 y.o.  female who presents today for   • Hypertension stable   • Leg Pain    (L) SIDE ANTERIOR AND LATERAL THIGH PAIN - FEELS LIKE A STABBING PAIN - HAS BEEN WAKING HER UP IN THE MIDDLE OF THE NIGHT. Recommend xray due to edema and doppler studies       Karla Farias  has a past medical history of Arthritis, Hyperlipidemia, and Hypertension.    No Known Allergies    Current Outpatient Medications:   •  gabapentin (NEURONTIN) 100 MG capsule, Take 1 capsule by mouth 2 (two) times a day., Disp: 60 capsule, Rfl: 3  •  hydroCHLOROthiazide (HYDRODIURIL) 12.5 MG tablet, Take 1 tablet by mouth once daily, Disp: 30 tablet, Rfl: 0  •  tiZANidine (ZANAFLEX) 2 MG tablet, Take 2 tablets by mouth Every 8 (Eight) Hours As Needed for Muscle Spasms., Disp: 40 tablet, Rfl: 2  Past Medical History:   Diagnosis Date   • Arthritis    • Hyperlipidemia    • Hypertension      Past Surgical History:   Procedure Laterality Date   •  SECTION      x2   • LASER ABLATION OF THE CERVIX  2019     Social History     Socioeconomic History   • Marital status:      Spouse name: Not on file   • Number of children: Not on file   • Years of education: Not on file   • Highest education level: Not on file   Tobacco Use   • Smoking status: Current Every Day Smoker     Packs/day: 1.00     Years: 30.00     Pack years: 30.00     Start date:    • Smokeless tobacco: Never Used   Substance and Sexual Activity   • Alcohol use: Yes     Alcohol/week: 1.0 standard drinks     Types: 1 Cans of beer per week   • Drug use: Never   • Sexual activity: Defer     Family History   Problem Relation Age of Onset   • Brain cancer Mother    • Lung cancer Father    • Hypertension Father    • Heart failure Father    •  Emphysema Father    • Diabetes Sister    • Hypertension Maternal Grandmother    • Hyperlipidemia Maternal Grandmother    • Alzheimer's disease Paternal Grandmother    • Heart failure Paternal Grandfather      PHQ-2 Depression Screening  Little interest or pleasure in doing things? 0   Feeling down, depressed, or hopeless? 0   PHQ-2 Total Score 0     PHQ-9 Depression Screening  Little interest or pleasure in doing things? 0   Feeling down, depressed, or hopeless? 0   Trouble falling or staying asleep, or sleeping too much?     Feeling tired or having little energy?     Poor appetite or overeating?     Feeling bad about yourself - or that you are a failure or have let yourself or your family down?     Trouble concentrating on things, such as reading the newspaper or watching television?     Moving or speaking so slowly that other people could have noticed? Or the opposite - being so fidgety or restless that you have been moving around a lot more than usual?     Thoughts that you would be better off dead, or of hurting yourself in some way?     PHQ-9 Total Score 0   If you checked off any problems, how difficult have these problems made it for you to do your work, take care of things at home, or get along with other people?         Family history, surgical history, past medical history, Allergies and med's reviewed with patient today and updated in [facility] EMR.     ROS:  Review of Systems   Constitutional: Negative for activity change, appetite change and fatigue.   Respiratory: Negative for cough and shortness of breath.    Cardiovascular: Negative for chest pain and leg swelling.   Gastrointestinal: Negative for abdominal pain and nausea.   Endocrine: Negative for polydipsia, polyphagia and polyuria.   Musculoskeletal: Negative for arthralgias, back pain and myalgias.   Skin: Negative for rash.   Neurological: Negative for speech difficulty and headaches.   Psychiatric/Behavioral: Negative for confusion and decreased  "concentration.   All other systems reviewed and are negative.      OBJECTIVE:  Vitals:    06/23/21 1005   BP: 123/85   BP Location: Left arm   Patient Position: Sitting   Cuff Size: Adult   Pulse: 80   Resp: 16   Temp: 97.5 °F (36.4 °C)   TempSrc: Infrared   SpO2: 97%   Weight: 90.4 kg (199 lb 3.2 oz)   Height: 157.5 cm (62\")     Body mass index is 36.43 kg/m².  Physical Exam  Vitals and nursing note reviewed.   Constitutional:       Appearance: Normal appearance. She is well-developed.   HENT:      Head: Normocephalic and atraumatic.   Eyes:      Conjunctiva/sclera: Conjunctivae normal.      Pupils: Pupils are equal, round, and reactive to light.   Cardiovascular:      Rate and Rhythm: Normal rate and regular rhythm.      Heart sounds: Normal heart sounds.   Pulmonary:      Effort: Pulmonary effort is normal.      Breath sounds: Normal breath sounds.   Abdominal:      General: Bowel sounds are normal.      Palpations: Abdomen is soft.   Musculoskeletal:         General: Normal range of motion.      Cervical back: Normal range of motion and neck supple.   Skin:     General: Skin is warm and dry.   Neurological:      Mental Status: She is alert and oriented to person, place, and time.   Psychiatric:         Behavior: Behavior normal.         Thought Content: Thought content normal.         Judgment: Judgment normal.         ASSESSMENT/ PLAN:    Diagnoses and all orders for this visit:    1. Pain in left lower leg (Primary)  -     XR Tibia & Fibula 1 View Left; Future  -     XR Ankle 3+ View Left; Future  -     Duplex Venous Lower Extremity - Left CAR; Future    2. Intermittent claudication (CMS/[facility])        Orders Placed Today:     No orders of the defined types were placed in this encounter.       Management Plan:   Xray left lower leg  Doppler study    An After Visit Summary was printed and given to the patient at discharge.    Follow-up: Return in about 2 weeks (around 7/7/2021).    Natalie Castelan, APRN 6/23/2021 " 11:14 EDT  This note was electronically signed.

## 2021-06-23 NOTE — TELEPHONE ENCOUNTER
----- Message from TOSHIA Flood sent at 6/23/2021 10:20 AM EDT -----  IMPRESSION:  No mammographic evidence of malignancy is demonstrated within either  breast. No significant change compared to prior exams.

## 2021-07-07 ENCOUNTER — OFFICE VISIT (OUTPATIENT)
Dept: FAMILY MEDICINE CLINIC | Facility: CLINIC | Age: 51
End: 2021-07-07

## 2021-07-07 ENCOUNTER — HOSPITAL ENCOUNTER (OUTPATIENT)
Dept: CARDIOLOGY | Facility: HOSPITAL | Age: 51
Discharge: HOME | End: 2021-07-07
Admitting: NURSE PRACTITIONER

## 2021-07-07 VITALS
HEIGHT: 62 IN | TEMPERATURE: 98.4 F | WEIGHT: 200 LBS | HEART RATE: 78 BPM | DIASTOLIC BLOOD PRESSURE: 87 MMHG | BODY MASS INDEX: 36.8 KG/M2 | SYSTOLIC BLOOD PRESSURE: 131 MMHG | OXYGEN SATURATION: 96 %

## 2021-07-07 DIAGNOSIS — M79.662 PAIN IN LEFT LOWER LEG: ICD-10-CM

## 2021-07-07 DIAGNOSIS — I10 ESSENTIAL HYPERTENSION: Primary | ICD-10-CM

## 2021-07-07 DIAGNOSIS — R73.9 HYPERGLYCEMIA: ICD-10-CM

## 2021-07-07 DIAGNOSIS — E55.9 VITAMIN D DEFICIENCY: ICD-10-CM

## 2021-07-07 DIAGNOSIS — E53.8 VITAMIN B12 DEFICIENCY: ICD-10-CM

## 2021-07-07 DIAGNOSIS — E78.2 MIXED HYPERLIPIDEMIA: ICD-10-CM

## 2021-07-07 DIAGNOSIS — R53.83 OTHER FATIGUE: ICD-10-CM

## 2021-07-07 LAB
BH CV LOWER VASCULAR LEFT COMMON FEMORAL AUGMENT: NORMAL
BH CV LOWER VASCULAR LEFT COMMON FEMORAL COMPETENT: NORMAL
BH CV LOWER VASCULAR LEFT COMMON FEMORAL COMPRESS: NORMAL
BH CV LOWER VASCULAR LEFT COMMON FEMORAL PHASIC: NORMAL
BH CV LOWER VASCULAR LEFT COMMON FEMORAL SPONT: NORMAL
BH CV LOWER VASCULAR LEFT DISTAL FEMORAL COMPRESS: NORMAL
BH CV LOWER VASCULAR LEFT GASTRONEMIUS COMPRESS: NORMAL
BH CV LOWER VASCULAR LEFT GREATER SAPH AK COMPRESS: NORMAL
BH CV LOWER VASCULAR LEFT GREATER SAPH BK COMPRESS: NORMAL
BH CV LOWER VASCULAR LEFT LESSER SAPH COMPRESS: NORMAL
BH CV LOWER VASCULAR LEFT MID FEMORAL AUGMENT: NORMAL
BH CV LOWER VASCULAR LEFT MID FEMORAL COMPETENT: NORMAL
BH CV LOWER VASCULAR LEFT MID FEMORAL COMPRESS: NORMAL
BH CV LOWER VASCULAR LEFT MID FEMORAL PHASIC: NORMAL
BH CV LOWER VASCULAR LEFT MID FEMORAL SPONT: NORMAL
BH CV LOWER VASCULAR LEFT PERONEAL COMPRESS: NORMAL
BH CV LOWER VASCULAR LEFT POPLITEAL AUGMENT: NORMAL
BH CV LOWER VASCULAR LEFT POPLITEAL COMPETENT: NORMAL
BH CV LOWER VASCULAR LEFT POPLITEAL COMPRESS: NORMAL
BH CV LOWER VASCULAR LEFT POPLITEAL PHASIC: NORMAL
BH CV LOWER VASCULAR LEFT POPLITEAL SPONT: NORMAL
BH CV LOWER VASCULAR LEFT POSTERIOR TIBIAL COMPRESS: NORMAL
BH CV LOWER VASCULAR LEFT PROFUNDA FEMORAL COMPRESS: NORMAL
BH CV LOWER VASCULAR LEFT PROXIMAL FEMORAL COMPRESS: NORMAL
BH CV LOWER VASCULAR LEFT SAPHENOFEMORAL JUNCTION COMPRESS: NORMAL
BH CV LOWER VASCULAR RIGHT COMMON FEMORAL AUGMENT: NORMAL
BH CV LOWER VASCULAR RIGHT COMMON FEMORAL COMPETENT: NORMAL
BH CV LOWER VASCULAR RIGHT COMMON FEMORAL COMPRESS: NORMAL
BH CV LOWER VASCULAR RIGHT COMMON FEMORAL PHASIC: NORMAL
BH CV LOWER VASCULAR RIGHT COMMON FEMORAL SPONT: NORMAL
MAXIMAL PREDICTED HEART RATE: 172 BPM
STRESS TARGET HR: 146 BPM

## 2021-07-07 PROCEDURE — 99213 OFFICE O/P EST LOW 20 MIN: CPT | Performed by: NURSE PRACTITIONER

## 2021-07-07 PROCEDURE — 80050 GENERAL HEALTH PANEL: CPT | Performed by: NURSE PRACTITIONER

## 2021-07-07 PROCEDURE — 82306 VITAMIN D 25 HYDROXY: CPT | Performed by: NURSE PRACTITIONER

## 2021-07-07 PROCEDURE — 80061 LIPID PANEL: CPT | Performed by: NURSE PRACTITIONER

## 2021-07-07 PROCEDURE — 84439 ASSAY OF FREE THYROXINE: CPT | Performed by: NURSE PRACTITIONER

## 2021-07-07 PROCEDURE — 83036 HEMOGLOBIN GLYCOSYLATED A1C: CPT | Performed by: NURSE PRACTITIONER

## 2021-07-07 PROCEDURE — 82607 VITAMIN B-12: CPT | Performed by: NURSE PRACTITIONER

## 2021-07-07 PROCEDURE — 93971 EXTREMITY STUDY: CPT

## 2021-07-07 RX ORDER — GABAPENTIN 100 MG/1
100 CAPSULE ORAL 2 TIMES DAILY
Qty: 60 CAPSULE | Refills: 3 | Status: SHIPPED | OUTPATIENT
Start: 2021-07-07 | End: 2021-08-04

## 2021-07-07 RX ORDER — ERGOCALCIFEROL 1.25 MG/1
50000 CAPSULE ORAL
Qty: 8 CAPSULE | Refills: 0 | Status: SHIPPED | OUTPATIENT
Start: 2021-07-07 | End: 2021-08-26

## 2021-07-07 NOTE — PROGRESS NOTES
Chief Complaint   Patient presents with   • Hypertension   • Leg Pain   • Follow-up        Subjective   Karla Farias is a 50 y.o.  female who presents today for   • Hypertension stable   • Leg Pain still having issues daily awaiting testing neuropathy of unknown origin   • Follow-up      Karla Farias  has a past medical history of Arthritis, Hyperlipidemia, and Hypertension.    No Known Allergies    Current Outpatient Medications:   •  gabapentin (NEURONTIN) 100 MG capsule, Take 1 capsule by mouth 2 (two) times a day., Disp: 60 capsule, Rfl: 3  •  hydroCHLOROthiazide (HYDRODIURIL) 12.5 MG tablet, Take 1 tablet by mouth once daily, Disp: 30 tablet, Rfl: 0  •  tiZANidine (ZANAFLEX) 2 MG tablet, Take 2 tablets by mouth Every 8 (Eight) Hours As Needed for Muscle Spasms., Disp: 40 tablet, Rfl: 2  •  vitamin D (ERGOCALCIFEROL) 1.25 MG (19514 UT) capsule capsule, Take 1 capsule by mouth Every 7 (Seven) Days for 8 doses., Disp: 8 capsule, Rfl: 0  Past Medical History:   Diagnosis Date   • Arthritis    • Hyperlipidemia    • Hypertension      Past Surgical History:   Procedure Laterality Date   •  SECTION      x2   • LASER ABLATION OF THE CERVIX  2019     Social History     Socioeconomic History   • Marital status:      Spouse name: Not on file   • Number of children: Not on file   • Years of education: Not on file   • Highest education level: Not on file   Tobacco Use   • Smoking status: Current Every Day Smoker     Packs/day: 1.00     Years: 30.00     Pack years: 30.00     Start date:    • Smokeless tobacco: Never Used   Substance and Sexual Activity   • Alcohol use: Yes     Alcohol/week: 1.0 standard drinks     Types: 1 Cans of beer per week   • Drug use: Never   • Sexual activity: Defer     Family History   Problem Relation Age of Onset   • Brain cancer Mother    • Lung cancer Father    • Hypertension Father    • Heart failure Father    • Emphysema Father    • Diabetes Sister    •  "Hypertension Maternal Grandmother    • Hyperlipidemia Maternal Grandmother    • Alzheimer's disease Paternal Grandmother    • Heart failure Paternal Grandfather      PHQ-2 Depression Screening  Little interest or pleasure in doing things?     Feeling down, depressed, or hopeless?     PHQ-2 Total Score       PHQ-9 Depression Screening  Little interest or pleasure in doing things?     Feeling down, depressed, or hopeless?     Trouble falling or staying asleep, or sleeping too much?     Feeling tired or having little energy?     Poor appetite or overeating?     Feeling bad about yourself - or that you are a failure or have let yourself or your family down?     Trouble concentrating on things, such as reading the newspaper or watching television?     Moving or speaking so slowly that other people could have noticed? Or the opposite - being so fidgety or restless that you have been moving around a lot more than usual?     Thoughts that you would be better off dead, or of hurting yourself in some way?     PHQ-9 Total Score     If you checked off any problems, how difficult have these problems made it for you to do your work, take care of things at home, or get along with other people?         Family history, surgical history, past medical history, Allergies and med's reviewed with patient today and updated in [employer] EMR.     ROS:  Review of Systems   Neurological: Positive for numbness.   All other systems reviewed and are negative.      OBJECTIVE:  Vitals:    07/07/21 0845   BP: 131/87   Pulse: 78   Temp: 98.4 °F (36.9 °C)   TempSrc: Infrared   SpO2: 96%   Weight: 90.7 kg (200 lb)   Height: 157.5 cm (62\")     Body mass index is 36.58 kg/m².  Physical Exam  Vitals and nursing note reviewed.   Constitutional:       Appearance: She is well-developed.   HENT:      Head: Normocephalic and atraumatic.   Eyes:      Conjunctiva/sclera: Conjunctivae normal.      Pupils: Pupils are equal, round, and reactive to light. "   Cardiovascular:      Rate and Rhythm: Normal rate and regular rhythm.      Heart sounds: Normal heart sounds.   Pulmonary:      Effort: Pulmonary effort is normal.      Breath sounds: Normal breath sounds.   Abdominal:      General: Bowel sounds are normal.      Palpations: Abdomen is soft.   Musculoskeletal:         General: Normal range of motion.      Cervical back: Normal range of motion and neck supple.   Skin:     General: Skin is warm and dry.   Neurological:      Mental Status: She is alert and oriented to person, place, and time.   Psychiatric:         Behavior: Behavior normal.         Thought Content: Thought content normal.         Judgment: Judgment normal.         ASSESSMENT/ PLAN:    Diagnoses and all orders for this visit:    1. Essential hypertension (Primary)    2. Vitamin D deficiency  -     Vitamin D 25 Hydroxy; Future  -     Vitamin D 25 Hydroxy    3. Vitamin B12 deficiency  -     Vitamin B12; Future  -     Vitamin B12    4. Mixed hyperlipidemia  -     Comprehensive Metabolic Panel; Future  -     Lipid Panel; Future  -     Comprehensive Metabolic Panel  -     Lipid Panel    5. Hyperglycemia  -     Hemoglobin A1c; Future  -     Hemoglobin A1c    6. Other fatigue  -     TSH; Future  -     T4, Free; Future  -     CBC & Differential; Future  -     TSH  -     T4, Free  -     CBC & Differential    Other orders  -     vitamin D (ERGOCALCIFEROL) 1.25 MG (46893 UT) capsule capsule; Take 1 capsule by mouth Every 7 (Seven) Days for 8 doses.  Dispense: 8 capsule; Refill: 0  -     gabapentin (NEURONTIN) 100 MG capsule; Take 1 capsule by mouth 2 (two) times a day.  Dispense: 60 capsule; Refill: 3        Orders Placed Today:     New Medications Ordered This Visit   Medications   • vitamin D (ERGOCALCIFEROL) 1.25 MG (23635 UT) capsule capsule     Sig: Take 1 capsule by mouth Every 7 (Seven) Days for 8 doses.     Dispense:  8 capsule     Refill:  0   • gabapentin (NEURONTIN) 100 MG capsule     Sig: Take 1  capsule by mouth 2 (two) times a day.     Dispense:  60 capsule     Refill:  3        Management Plan:   Refills neurotin   Labs today  Refill sent  Referral to podiatry pending   If worsens contact office for f/u     An After Visit Summary was printed and given to the patient at discharge.    Follow-up: Return in about 4 weeks (around 8/4/2021).    TOSHIA Kennedy 7/29/2021 09:17 EDT  This note was electronically signed.

## 2021-07-08 ENCOUNTER — TELEPHONE (OUTPATIENT)
Dept: FAMILY MEDICINE CLINIC | Facility: CLINIC | Age: 51
End: 2021-07-08

## 2021-07-08 LAB
25(OH)D3 SERPL-MCNC: 22 NG/ML (ref 30–100)
ALBUMIN SERPL-MCNC: 4.1 G/DL (ref 3.5–5.2)
ALBUMIN/GLOB SERPL: 1.6 G/DL
ALP SERPL-CCNC: 39 U/L (ref 39–117)
ALT SERPL W P-5'-P-CCNC: 9 U/L (ref 1–33)
ANION GAP SERPL CALCULATED.3IONS-SCNC: 9 MMOL/L (ref 5–15)
AST SERPL-CCNC: 16 U/L (ref 1–32)
BASOPHILS # BLD AUTO: 0.03 10*3/MM3 (ref 0–0.2)
BASOPHILS NFR BLD AUTO: 0.4 % (ref 0–1.5)
BILIRUB SERPL-MCNC: 0.3 MG/DL (ref 0–1.2)
BUN SERPL-MCNC: 9 MG/DL (ref 6–20)
BUN/CREAT SERPL: 15.5 (ref 7–25)
CALCIUM SPEC-SCNC: 8.6 MG/DL (ref 8.6–10.5)
CHLORIDE SERPL-SCNC: 103 MMOL/L (ref 98–107)
CHOLEST SERPL-MCNC: 238 MG/DL (ref 0–200)
CO2 SERPL-SCNC: 27 MMOL/L (ref 22–29)
CREAT SERPL-MCNC: 0.58 MG/DL (ref 0.57–1)
DEPRECATED RDW RBC AUTO: 40 FL (ref 37–54)
EOSINOPHIL # BLD AUTO: 0.15 10*3/MM3 (ref 0–0.4)
EOSINOPHIL NFR BLD AUTO: 2 % (ref 0.3–6.2)
ERYTHROCYTE [DISTWIDTH] IN BLOOD BY AUTOMATED COUNT: 12.9 % (ref 12.3–15.4)
GFR SERPL CREATININE-BSD FRML MDRD: 111 ML/MIN/1.73
GLOBULIN UR ELPH-MCNC: 2.6 GM/DL
GLUCOSE SERPL-MCNC: 77 MG/DL (ref 65–99)
HBA1C MFR BLD: 5.7 % (ref 3.5–5.6)
HCT VFR BLD AUTO: 42.6 % (ref 34–46.6)
HDLC SERPL-MCNC: 49 MG/DL (ref 40–60)
HGB BLD-MCNC: 14.1 G/DL (ref 12–15.9)
IMM GRANULOCYTES # BLD AUTO: 0.04 10*3/MM3 (ref 0–0.05)
IMM GRANULOCYTES NFR BLD AUTO: 0.5 % (ref 0–0.5)
LDLC SERPL CALC-MCNC: 162 MG/DL (ref 0–100)
LDLC/HDLC SERPL: 3.24 {RATIO}
LYMPHOCYTES # BLD AUTO: 2.05 10*3/MM3 (ref 0.7–3.1)
LYMPHOCYTES NFR BLD AUTO: 27.6 % (ref 19.6–45.3)
MCH RBC QN AUTO: 28.3 PG (ref 26.6–33)
MCHC RBC AUTO-ENTMCNC: 33.1 G/DL (ref 31.5–35.7)
MCV RBC AUTO: 85.5 FL (ref 79–97)
MONOCYTES # BLD AUTO: 0.55 10*3/MM3 (ref 0.1–0.9)
MONOCYTES NFR BLD AUTO: 7.4 % (ref 5–12)
NEUTROPHILS NFR BLD AUTO: 4.6 10*3/MM3 (ref 1.7–7)
NEUTROPHILS NFR BLD AUTO: 62.1 % (ref 42.7–76)
NRBC BLD AUTO-RTO: 0 /100 WBC (ref 0–0.2)
PLATELET # BLD AUTO: 230 10*3/MM3 (ref 140–450)
PMV BLD AUTO: 11.4 FL (ref 6–12)
POTASSIUM SERPL-SCNC: 4 MMOL/L (ref 3.5–5.2)
PROT SERPL-MCNC: 6.7 G/DL (ref 6–8.5)
RBC # BLD AUTO: 4.98 10*6/MM3 (ref 3.77–5.28)
SODIUM SERPL-SCNC: 139 MMOL/L (ref 136–145)
T4 FREE SERPL-MCNC: 1.04 NG/DL (ref 0.93–1.7)
TRIGL SERPL-MCNC: 150 MG/DL (ref 0–150)
TSH SERPL DL<=0.05 MIU/L-ACNC: 2.76 UIU/ML (ref 0.27–4.2)
VIT B12 BLD-MCNC: 419 PG/ML (ref 211–946)
VLDLC SERPL-MCNC: 27 MG/DL (ref 5–40)
WBC # BLD AUTO: 7.42 10*3/MM3 (ref 3.4–10.8)

## 2021-07-08 NOTE — TELEPHONE ENCOUNTER
"CALLED AND S/W PATIENT RE: DOPPLER RESULTS. SHE VOICED UNDERSTANDING, BUT IS CONCERNED DUE TO THE DOPPLER NOT BEING PERFORMED WHERE SHE WAS \"HURTING.\" SHE ADVISED THAT SHE IS HURTING AROUND HER LEFT ANKLE AREA, AS WELL AS HER HEEL WITH PAIN THAT SHOOTS UP AROUND HER \"ANKLE BONE.\" SHE CONTINUED TO VOICE THAT SHE HAS A \"KNOT\" BELOW THE ANKLE BONE, AND AT THE END OF A WORK DAY, SHE CAN BARELY WALK ON IT AND IT IS SO SORE, TENDER, AND PAINFUL. SHE HAS NOT HAD ANY X-RAYS ON THIS AREA. PLEASE ADVISE. THANK YOU.   "

## 2021-07-08 NOTE — TELEPHONE ENCOUNTER
----- Message from TOSHIA Flood sent at 7/7/2021  2:30 PM EDT -----  Result Text  · Normal left lower extremity venous duplex scan

## 2021-07-09 NOTE — TELEPHONE ENCOUNTER
Hub is instructed to read this note to patient.  CALLED PATIENT. NO ANSWER. PER HIPAA, MAY LEAVE DETAILED VM.  LEFT ADVISING PT OF THE FOLLOWING:  - PROVIDER ALLAN ADVISED THAT SHE COULD ORDER AN X-RAY IF PT WOULD LIKE.   - PT WAS INSTRUCTED IN VM TO CONTACT OFFICE TO LET US KNOW IF SHE WOULD LIKE TO HAVE AN X-RAY ORDERED, AND IF SO WHERE SHE WANTS TO HAVE IT COMPLETED.

## 2021-07-15 DIAGNOSIS — M79.662 PAIN IN LEFT LOWER LEG: ICD-10-CM

## 2021-07-16 ENCOUNTER — TELEPHONE (OUTPATIENT)
Dept: FAMILY MEDICINE CLINIC | Facility: CLINIC | Age: 51
End: 2021-07-16

## 2021-07-16 DIAGNOSIS — M79.662 PAIN IN LEFT LOWER LEG: Primary | ICD-10-CM

## 2021-07-16 DIAGNOSIS — M25.572 LEFT ANKLE PAIN, UNSPECIFIED CHRONICITY: ICD-10-CM

## 2021-07-16 NOTE — TELEPHONE ENCOUNTER
PATIENT NOTIFIED. HOWEVER, SHE IS CONCERNED WITH THE ANKLE SHOWING ENTHESOPATHY ON THE X-RAY. SHE WANTS TO KNOW WHAT YOU WANT HER TO DO. PLEASE ADVISE.

## 2021-07-19 NOTE — TELEPHONE ENCOUNTER
I would recommend her to see ortho to discuss further   I can also attempt to get an MRI approved to see if we can see the tendons and ligaments more clear

## 2021-07-20 NOTE — TELEPHONE ENCOUNTER
CALLED AND S/W PATIENT. ADVISED HER OF RESPONSE TO SEE ORTHO. SHE IS IN AGREEMENT. REFERRAL PLACED.

## 2021-07-28 ENCOUNTER — TELEPHONE (OUTPATIENT)
Dept: ORTHOPEDIC SURGERY | Facility: CLINIC | Age: 51
End: 2021-07-28

## 2021-07-28 NOTE — TELEPHONE ENCOUNTER
Caller: LARRY CORBIN    Relationship to patient: SELF    Best call back number: [phone]    Chief complaint: FOOT/ANKLE PAIN    Type of visit: NEW PATIENT    Requested date: ASAP    If rescheduling, when is the original appointment: 8.23.21    Additional notes: PATIENT WANTED TO KNOW IF THERE WAS ANYWAY SHE COULD BE WORKED IN SOONER--    - IS NOT DIABETIC  -DOES NOT HAVE ANY OPEN OR NON HEALING WOUNDS/ULCERS.

## 2021-08-04 ENCOUNTER — OFFICE VISIT (OUTPATIENT)
Dept: FAMILY MEDICINE CLINIC | Facility: CLINIC | Age: 51
End: 2021-08-04

## 2021-08-04 VITALS
OXYGEN SATURATION: 97 % | BODY MASS INDEX: 36.18 KG/M2 | TEMPERATURE: 97.1 F | HEART RATE: 74 BPM | SYSTOLIC BLOOD PRESSURE: 134 MMHG | DIASTOLIC BLOOD PRESSURE: 85 MMHG | HEIGHT: 62 IN | RESPIRATION RATE: 16 BRPM | WEIGHT: 196.6 LBS

## 2021-08-04 DIAGNOSIS — R20.0 NUMBNESS AND TINGLING OF BOTH LEGS: ICD-10-CM

## 2021-08-04 DIAGNOSIS — R53.83 OTHER FATIGUE: ICD-10-CM

## 2021-08-04 DIAGNOSIS — R20.2 NUMBNESS AND TINGLING OF BOTH LEGS: ICD-10-CM

## 2021-08-04 DIAGNOSIS — I10 ESSENTIAL HYPERTENSION: ICD-10-CM

## 2021-08-04 DIAGNOSIS — M79.662 PAIN IN LEFT LOWER LEG: Primary | ICD-10-CM

## 2021-08-04 PROCEDURE — 99214 OFFICE O/P EST MOD 30 MIN: CPT | Performed by: NURSE PRACTITIONER

## 2021-08-04 RX ORDER — GABAPENTIN 300 MG/1
300 CAPSULE ORAL 2 TIMES DAILY
Qty: 60 CAPSULE | Refills: 1 | Status: SHIPPED | OUTPATIENT
Start: 2021-08-04 | End: 2021-10-12

## 2021-08-04 RX ORDER — TIZANIDINE 2 MG/1
4 TABLET ORAL EVERY 8 HOURS PRN
Qty: 180 TABLET | Refills: 0 | Status: SHIPPED | OUTPATIENT
Start: 2021-08-04 | End: 2022-06-09 | Stop reason: SDUPTHER

## 2021-08-04 RX ORDER — MELOXICAM 7.5 MG/1
7.5 TABLET ORAL DAILY
Qty: 30 TABLET | Refills: 0 | Status: SHIPPED | OUTPATIENT
Start: 2021-08-04 | End: 2021-09-03

## 2021-08-04 NOTE — PROGRESS NOTES
Chief Complaint   Patient presents with   • Weight Check   • Hypertension   • Fatigue   • Foot Pain     Sees podiatry later this month. Gabapentin seems to be helping a little bit.         Subjective   Karla Farias is a 50 y.o.  female who presents today for   • Weight Check discussed intermittent fasting this is helping with weight loss  • Hypertension stable  • Fatigue some improved   • Foot Pain    Sees podiatry later this month. Gabapentin seems to be helping a little bit. Trial of physical therapy       Karla Farias  has a past medical history of Arthritis, Hyperlipidemia, and Hypertension.    No Known Allergies    Current Outpatient Medications:   •  tiZANidine (ZANAFLEX) 2 MG tablet, Take 2 tablets by mouth Every 8 (Eight) Hours As Needed for Muscle Spasms., Disp: 180 tablet, Rfl: 0  •  vitamin D (ERGOCALCIFEROL) 1.25 MG (07944 UT) capsule capsule, Take 1 capsule by mouth Every 7 (Seven) Days for 8 doses., Disp: 8 capsule, Rfl: 0  •  gabapentin (NEURONTIN) 300 MG capsule, Take 1 capsule by mouth 2 (two) times a day for 60 days., Disp: 60 capsule, Rfl: 1  •  hydroCHLOROthiazide (HYDRODIURIL) 12.5 MG tablet, TAKE 1 TABLET BY MOUTH ONCE DAILY -NEEDS  LABS, Disp: 30 tablet, Rfl: 0  •  meloxicam (Mobic) 7.5 MG tablet, Take 1 tablet by mouth Daily for 30 days., Disp: 30 tablet, Rfl: 0  Past Medical History:   Diagnosis Date   • Arthritis    • Hyperlipidemia    • Hypertension      Past Surgical History:   Procedure Laterality Date   •  SECTION      x2   • LASER ABLATION OF THE CERVIX  2019     Social History     Socioeconomic History   • Marital status:      Spouse name: Not on file   • Number of children: Not on file   • Years of education: Not on file   • Highest education level: Not on file   Tobacco Use   • Smoking status: Current Every Day Smoker     Packs/day: 1.00     Years: 30.00     Pack years: 30.00     Start date:    • Smokeless tobacco: Never Used   Substance and Sexual  Activity   • Alcohol use: Yes     Alcohol/week: 1.0 standard drinks     Types: 1 Cans of beer per week   • Drug use: Never   • Sexual activity: Defer     Family History   Problem Relation Age of Onset   • Brain cancer Mother    • Lung cancer Father    • Hypertension Father    • Heart failure Father    • Emphysema Father    • Diabetes Sister    • Hypertension Maternal Grandmother    • Hyperlipidemia Maternal Grandmother    • Alzheimer's disease Paternal Grandmother    • Heart failure Paternal Grandfather      PHQ-2 Depression Screening  Little interest or pleasure in doing things?     Feeling down, depressed, or hopeless?     PHQ-2 Total Score       PHQ-9 Depression Screening  Little interest or pleasure in doing things?     Feeling down, depressed, or hopeless?     Trouble falling or staying asleep, or sleeping too much?     Feeling tired or having little energy?     Poor appetite or overeating?     Feeling bad about yourself - or that you are a failure or have let yourself or your family down?     Trouble concentrating on things, such as reading the newspaper or watching television?     Moving or speaking so slowly that other people could have noticed? Or the opposite - being so fidgety or restless that you have been moving around a lot more than usual?     Thoughts that you would be better off dead, or of hurting yourself in some way?     PHQ-9 Total Score     If you checked off any problems, how difficult have these problems made it for you to do your work, take care of things at home, or get along with other people?         Family history, surgical history, past medical history, Allergies and med's reviewed with patient today and updated in [facility].     ROS:  Review of Systems   Constitutional: Positive for fatigue.   All other systems reviewed and are negative.      OBJECTIVE:  Vitals:    08/04/21 0809   BP: 134/85   BP Location: Left arm   Patient Position: Sitting   Cuff Size: Adult   Pulse: 74   Resp: 16  "  Temp: 97.1 °F (36.2 °C)   TempSrc: Infrared   SpO2: 97%   Weight: 89.2 kg (196 lb 9.6 oz)   Height: 157.5 cm (62\")     Body mass index is 35.96 kg/m².  Physical Exam  Vitals and nursing note reviewed.   Constitutional:       Appearance: Normal appearance. She is well-developed.   HENT:      Head: Normocephalic and atraumatic.   Eyes:      Conjunctiva/sclera: Conjunctivae normal.      Pupils: Pupils are equal, round, and reactive to light.   Cardiovascular:      Rate and Rhythm: Normal rate and regular rhythm.      Heart sounds: Normal heart sounds.   Pulmonary:      Effort: Pulmonary effort is normal.      Breath sounds: Normal breath sounds.   Abdominal:      General: Bowel sounds are normal.      Palpations: Abdomen is soft.   Musculoskeletal:         General: Normal range of motion.      Cervical back: Normal range of motion and neck supple.   Skin:     General: Skin is warm and dry.   Neurological:      Mental Status: She is alert and oriented to person, place, and time.   Psychiatric:         Behavior: Behavior normal.         Thought Content: Thought content normal.         Judgment: Judgment normal.         ASSESSMENT/ PLAN:    Diagnoses and all orders for this visit:    1. Pain in left lower leg (Primary)  -     tiZANidine (ZANAFLEX) 2 MG tablet; Take 2 tablets by mouth Every 8 (Eight) Hours As Needed for Muscle Spasms.  Dispense: 180 tablet; Refill: 0  -     meloxicam (Mobic) 7.5 MG tablet; Take 1 tablet by mouth Daily for 30 days.  Dispense: 30 tablet; Refill: 0  -     Ambulatory Referral to Physical Therapy Evaluate and treat    2. Essential hypertension    3. Other fatigue    4. Numbness and tingling of both legs    Other orders  -     gabapentin (NEURONTIN) 300 MG capsule; Take 1 capsule by mouth 2 (two) times a day for 60 days.  Dispense: 60 capsule; Refill: 1        Orders Placed Today:     New Medications Ordered This Visit   Medications   • tiZANidine (ZANAFLEX) 2 MG tablet     Sig: Take 2 tablets " by mouth Every 8 (Eight) Hours As Needed for Muscle Spasms.     Dispense:  180 tablet     Refill:  0   • meloxicam (Mobic) 7.5 MG tablet     Sig: Take 1 tablet by mouth Daily for 30 days.     Dispense:  30 tablet     Refill:  0   • gabapentin (NEURONTIN) 300 MG capsule     Sig: Take 1 capsule by mouth 2 (two) times a day for 60 days.     Dispense:  60 capsule     Refill:  1        Management Plan:   Decrease diet reductions  PT orders  Trial of muscle relaxers  Gabapentin helping with numbness refill sent       An After Visit Summary was printed and given to the patient at discharge.    Follow-up: Return in about 8 weeks (around 9/29/2021).    TOSHIA Kennedy 8/11/2021 12:45 EDT  This note was electronically signed.

## 2021-08-05 DIAGNOSIS — I10 ESSENTIAL HYPERTENSION: ICD-10-CM

## 2021-08-05 RX ORDER — HYDROCHLOROTHIAZIDE 12.5 MG/1
TABLET ORAL
Qty: 30 TABLET | Refills: 0 | Status: SHIPPED | OUTPATIENT
Start: 2021-08-05 | End: 2021-09-02

## 2021-08-20 RX ORDER — ATORVASTATIN CALCIUM 20 MG/1
20 TABLET, FILM COATED ORAL DAILY
Qty: 90 TABLET | Refills: 0 | Status: SHIPPED | OUTPATIENT
Start: 2021-08-20 | End: 2021-12-03

## 2021-08-23 ENCOUNTER — OFFICE VISIT (OUTPATIENT)
Dept: PODIATRY | Facility: CLINIC | Age: 51
End: 2021-08-23

## 2021-08-23 ENCOUNTER — TELEPHONE (OUTPATIENT)
Dept: FAMILY MEDICINE CLINIC | Facility: CLINIC | Age: 51
End: 2021-08-23

## 2021-08-23 VITALS
HEIGHT: 62 IN | HEART RATE: 76 BPM | BODY MASS INDEX: 35.66 KG/M2 | RESPIRATION RATE: 20 BRPM | DIASTOLIC BLOOD PRESSURE: 91 MMHG | WEIGHT: 193.8 LBS | SYSTOLIC BLOOD PRESSURE: 132 MMHG

## 2021-08-23 DIAGNOSIS — G89.29 CHRONIC PAIN OF LEFT ANKLE: Primary | ICD-10-CM

## 2021-08-23 DIAGNOSIS — M72.2 PLANTAR FASCIITIS OF LEFT FOOT: ICD-10-CM

## 2021-08-23 DIAGNOSIS — M25.572 CHRONIC PAIN OF LEFT ANKLE: Primary | ICD-10-CM

## 2021-08-23 PROCEDURE — 20550 NJX 1 TENDON SHEATH/LIGAMENT: CPT | Performed by: PODIATRIST

## 2021-08-23 PROCEDURE — 99203 OFFICE O/P NEW LOW 30 MIN: CPT | Performed by: PODIATRIST

## 2021-08-23 RX ORDER — TRIAMCINOLONE ACETONIDE 40 MG/ML
20 INJECTION, SUSPENSION INTRA-ARTICULAR; INTRAMUSCULAR ONCE
Status: COMPLETED | OUTPATIENT
Start: 2021-08-23 | End: 2021-08-23

## 2021-08-23 RX ADMIN — TRIAMCINOLONE ACETONIDE 20 MG: 40 INJECTION, SUSPENSION INTRA-ARTICULAR; INTRAMUSCULAR at 09:09

## 2021-08-23 NOTE — TELEPHONE ENCOUNTER
Spoke with patient re: her lab results, she voiced understanding about the need for med for cholesterol.  Patient will watch diet, sugars and carbs.  She has a follow up next month

## 2021-08-24 NOTE — PROGRESS NOTES
2021  Foot and Ankle Surgery - New Patient   Provider: Dr. Lam Villalta, KELLEY  Location: [facility]    Subjective:  Karla Farias is a 50 y.o. female.     Chief Complaint   Patient presents with   • Left Ankle - Pain, Numbness   • Establish Care     c/o lt ankle pain        HPI: Patient is a 50-year-old female that presents with 4-month duration of pain involving her left lower leg and heel.  She states that the symptoms are present with first few steps in the morning after long periods of activity.  She has discussed these matters with her primary care physician and did have a Doppler in July which was negative for DVT.  She has also had previous imaging performed.  She denies any injury to the lower leg.  She rates the pain a 8 out of 10 when noticed.  She feels that this has been a gradually progressive issue.  Denies any problems involving the right lower extremity    No Known Allergies    Past Medical History:   Diagnosis Date   • Arthritis    • Hyperlipidemia    • Hypertension        Past Surgical History:   Procedure Laterality Date   •  SECTION      x2   • LASER ABLATION OF THE CERVIX  2019       Family History   Problem Relation Age of Onset   • Brain cancer Mother    • Lung cancer Father    • Hypertension Father    • Heart failure Father    • Emphysema Father    • Diabetes Sister    • Hypertension Maternal Grandmother    • Hyperlipidemia Maternal Grandmother    • Alzheimer's disease Paternal Grandmother    • Heart failure Paternal Grandfather        Social History     Socioeconomic History   • Marital status:      Spouse name: Not on file   • Number of children: Not on file   • Years of education: Not on file   • Highest education level: Not on file   Tobacco Use   • Smoking status: Current Every Day Smoker     Packs/day: 1.00     Years: 30.00     Pack years: 30.00     Start date:    • Smokeless tobacco: Never Used   Vaping Use   • Vaping Use: Never used   Substance  "and Sexual Activity   • Alcohol use: Yes     Alcohol/week: 1.0 standard drinks     Types: 1 Cans of beer per week   • Drug use: Never   • Sexual activity: Defer        Current Outpatient Medications on File Prior to Visit   Medication Sig Dispense Refill   • atorvastatin (LIPITOR) 20 MG tablet Take 1 tablet by mouth Daily. 90 tablet 0   • gabapentin (NEURONTIN) 300 MG capsule Take 1 capsule by mouth 2 (two) times a day for 60 days. 60 capsule 1   • hydroCHLOROthiazide (HYDRODIURIL) 12.5 MG tablet TAKE 1 TABLET BY MOUTH ONCE DAILY -NEEDS  LABS 30 tablet 0   • meloxicam (Mobic) 7.5 MG tablet Take 1 tablet by mouth Daily for 30 days. 30 tablet 0   • tiZANidine (ZANAFLEX) 2 MG tablet Take 2 tablets by mouth Every 8 (Eight) Hours As Needed for Muscle Spasms. 180 tablet 0   • vitamin D (ERGOCALCIFEROL) 1.25 MG (57203 UT) capsule capsule Take 1 capsule by mouth Every 7 (Seven) Days for 8 doses. 8 capsule 0     No current facility-administered medications on file prior to visit.       Review of Systems:  General: Denies fever, chills, fatigue, and weakness.  Eyes: Denies vision loss, blurry vision, and excessive redness.  ENT: Denies hearing issues and difficulty swallowing.  Cardiovascular: Denies palpitations, chest pain, or syncopal episodes.  Respiratory: Denies shortness of breath, wheezing, and coughing.  GI: Denies abdominal pain, nausea, and vomiting.   : Denies frequency, hematuria, and urgency.  Musculoskeletal: + Left heel pain  Derm: Denies rash, open wounds, or suspicious lesions.  Neuro: Denies headaches, numbness, loss of coordination, and tremors.  Psych: Denies anxiety and depression.  Endocrine: Denies temperature intolerance and changes in appetite.  Heme: Denies bleeding disorders or abnormal bruising.     Objective   /91 (BP Location: Left arm, Patient Position: Sitting, Cuff Size: Large Adult)   Pulse 76   Resp 20   Ht 157.5 cm (62\")   Wt 87.9 kg (193 lb 12.8 oz)   BMI 35.45 kg/m² "     Foot/Ankle Exam:       General:   Appearance: appears stated age and healthy    Orientation: AAOx3    Affect: appropriate    Gait: antalgic      VASCULAR      Left Foot Vascularity   Normal vascular exam    Dorsalis pedis:  2+  Posterior tibial:  2+  Skin Temperature: warm    Edema Grading:  None  CFT:  < 3 seconds  Pedal Hair Growth:  Present  Varicosities: none        NEUROLOGIC     Left Foot Neurologic   Light touch sensation:  Normal  Hot/cold sensation: normal    Achilles reflex:  2+     MUSCULOSKELETAL      Left Foot Musculoskeletal   Ecchymosis:  None  Tenderness: plantar fascia and plantar heel    Arch:  Normal     MUSCLE STRENGTH     Left Foot Muscle Strength   Normal strength    Foot dorsiflexion:  5  Foot plantar flexion:  5  Foot inversion:  5  Foot eversion:  5     DERMATOLOGIC     Left Foot Dermatologic   Skin: skin intact       TESTS     Left Foot Tests   Anterior drawer: negative    Varus tilt: negative        Left Foot Additional Comments: No significant pain with palpation to the lower leg.  Mild discomfort involving the Achilles tendon and moderate discomfort to the plantar heel.  Moderate soft tissue rigidity and equinus contracture      Assessment/Plan   Diagnoses and all orders for this visit:    1. Chronic pain of left ankle (Primary)  -     XR Ankle 3+ View Left  -     triamcinolone acetonide (KENALOG-40) injection 20 mg    2. Plantar fasciitis of left foot      Patient presents with longstanding issues involving the left lower extremity.  She did have pain involving the lower leg, Achilles, and now the plantar heel.  After evaluation, I do feel that her issues are secondary to plantar fasciitis.  Previous imaging was independently reviewed showing no obvious fractures or dislocations.  I did review the diagnosis, and treatment options.  Given her level of discomfort, I have suggested that we proceed with a steroid injection.  We reviewed the risks and benefits.  Procedure was performed  under ultrasound guidance without complication.  I have asked that she obtain a pair of over-the-counter power step inserts.  We did review proper use and effects.  She is to avoid barefoot and unsupported weightbearing.  I would also like her to start stretching and manual therapy exercises on a routine basis.  We did discuss rice therapy and proper use of OTC anti-inflammatories.  Patient is to call with any additional issues or concerns.  I would like to see her in 4 weeks for reevaluation.  Greater than 30 minutes was spent before, during, and after evaluation for patient care    Plantar Fascia Steroid Injection: Left    Informed consent was obtained before proceeding with the procedure. The area of maximal tenderness was palpated near the plantar medial calcaneal tuberosity of left foot.  The area was cleansed with alcohol.  Under ultrasound guidance, the plantar fascia was visualized and a solution totaling 1.5 mL was injected about the origin of the plantar fascia.  The solution contained 0.5 mL of 1% lidocaine plain, 0.5 mL of 0.5% Marcaine plain, and 0.5 mL of Kenalog.  After the injection, the patient noted immediate pain relief.  Mild compression was placed at the injection site followed by a sterile bandage.  The patient tolerated the injection well without complication.      Orders Placed This Encounter   Procedures   • XR Ankle 3+ View Left     Scheduling Instructions:      rm 15 wb     Order Specific Question:   Reason for Exam:     Answer:   lt ankle pain     Order Specific Question:   Patient Pregnant     Answer:   No     Order Specific Question:   Does this patient have a diabetic monitoring/medication delivering device on?     Answer:   No     Order Specific Question:   Release to patient     Answer:   Immediate        Note is dictated utilizing voice recognition software. Unfortunately this leads to occasional typographical errors. I apologize in advance if the situation occurs. If questions  occur please do not hesitate to call our office.

## 2021-09-02 DIAGNOSIS — I10 ESSENTIAL HYPERTENSION: ICD-10-CM

## 2021-09-02 RX ORDER — HYDROCHLOROTHIAZIDE 12.5 MG/1
TABLET ORAL
Qty: 30 TABLET | Refills: 0 | Status: SHIPPED | OUTPATIENT
Start: 2021-09-02 | End: 2021-10-12

## 2021-09-20 ENCOUNTER — OFFICE VISIT (OUTPATIENT)
Dept: PODIATRY | Facility: CLINIC | Age: 51
End: 2021-09-20

## 2021-09-20 VITALS
DIASTOLIC BLOOD PRESSURE: 89 MMHG | SYSTOLIC BLOOD PRESSURE: 125 MMHG | HEIGHT: 62 IN | HEART RATE: 92 BPM | BODY MASS INDEX: 34.96 KG/M2 | WEIGHT: 190 LBS

## 2021-09-20 DIAGNOSIS — M72.2 PLANTAR FASCIITIS OF LEFT FOOT: ICD-10-CM

## 2021-09-20 DIAGNOSIS — M25.572 CHRONIC PAIN OF LEFT ANKLE: Primary | ICD-10-CM

## 2021-09-20 DIAGNOSIS — G89.29 CHRONIC PAIN OF LEFT ANKLE: Primary | ICD-10-CM

## 2021-09-20 PROCEDURE — 99213 OFFICE O/P EST LOW 20 MIN: CPT | Performed by: PODIATRIST

## 2021-09-20 NOTE — PROGRESS NOTES
"09/20/2021  Foot and Ankle Surgery - Established Patient/Follow-up  Provider: Dr. Lam Villalta DPM  Location: [facility]    Subjective:  Karla Farias is a 51 y.o. female.     Chief Complaint   Patient presents with   • Left Foot - Pain   • Follow-up     last pcp appt 7/1/2021       HPI: Patient returns for follow-up regarding her left foot pain.  She has noticed improvement since last exam.  She continues to have issues intermittently.  She has been evaluated by PT but has not had repeat session.    No Known Allergies    Current Outpatient Medications on File Prior to Visit   Medication Sig Dispense Refill   • atorvastatin (LIPITOR) 20 MG tablet Take 1 tablet by mouth Daily. 90 tablet 0   • gabapentin (NEURONTIN) 300 MG capsule Take 1 capsule by mouth 2 (two) times a day for 60 days. 60 capsule 1   • hydroCHLOROthiazide (HYDRODIURIL) 12.5 MG tablet TAKE 1 TABLET BY MOUTH ONCE DAILY -NEEDS  LABS 30 tablet 0   • tiZANidine (ZANAFLEX) 2 MG tablet Take 2 tablets by mouth Every 8 (Eight) Hours As Needed for Muscle Spasms. 180 tablet 0     No current facility-administered medications on file prior to visit.       Objective   /89   Pulse 92   Ht 157.5 cm (62\")   Wt 86.2 kg (190 lb)   BMI 34.75 kg/m²     General:   Appearance: appears stated age and healthy    Orientation: AAOx3    Affect: appropriate    Gait: antalgic       VASCULAR       Left Foot Vascularity   Normal vascular exam    Dorsalis pedis:  2+  Posterior tibial:  2+  Skin Temperature: warm    Edema Grading:  None  CFT:  < 3 seconds  Pedal Hair Growth:  Present  Varicosities: none        NEUROLOGIC      Left Foot Neurologic   Light touch sensation:  Normal  Hot/cold sensation: normal    Achilles reflex:  2+      MUSCULOSKELETAL       Left Foot Musculoskeletal   Ecchymosis:  None  Tenderness: plantar fascia and plantar heel    Arch:  Normal      MUSCLE STRENGTH      Left Foot Muscle Strength   Normal strength    Foot dorsiflexion:  5  Foot " plantar flexion:  5  Foot inversion:  5  Foot eversion:  5      DERMATOLOGIC      Left Foot Dermatologic   Skin: skin intact        TESTS      Left Foot Tests   Anterior drawer: negative    Varus tilt: negative    Assessment/Plan   Diagnoses and all orders for this visit:    1. Chronic pain of left ankle (Primary)    2. Plantar fasciitis of left foot      Patient returns for follow-up regarding left lower extremity.  Patient has noticed improvement and states that she is 40% better overall.  She continues to have discomfort intermittently throughout the day.  She states that she was prescribed physical therapy by another provider and has gone for consultation but has not had any follow-up sessions.  I do feel that PT would be an appropriate option for patient at this time as she does notice significant improvement with stretching exercises.  Patient understands and agrees.  I have asked that she monitor her symptoms and return in 2 months for reevaluation.  Greater than 20 minutes was spent before, during, and after evaluation for patient care    No orders of the defined types were placed in this encounter.         Note is dictated utilizing voice recognition software. Unfortunately this leads to occasional typographical errors. I apologize in advance if the situation occurs. If questions occur please do not hesitate to call our office.

## 2021-09-30 ENCOUNTER — TELEPHONE (OUTPATIENT)
Dept: FAMILY MEDICINE CLINIC | Facility: CLINIC | Age: 51
End: 2021-09-30

## 2021-09-30 NOTE — TELEPHONE ENCOUNTER
LEFT MESSAGE FOR PATIENT TO SEE IF SHE WANTS TO RESCHEDULE HER APPOINTMENT SHE CANCELED YESTERDAY WITH REJI LEMUS

## 2021-10-11 DIAGNOSIS — I10 ESSENTIAL HYPERTENSION: ICD-10-CM

## 2021-10-12 RX ORDER — GABAPENTIN 300 MG/1
CAPSULE ORAL
Qty: 60 CAPSULE | Refills: 0 | Status: SHIPPED | OUTPATIENT
Start: 2021-10-12 | End: 2021-11-22

## 2021-10-12 RX ORDER — HYDROCHLOROTHIAZIDE 12.5 MG/1
TABLET ORAL
Qty: 30 TABLET | Refills: 0 | Status: SHIPPED | OUTPATIENT
Start: 2021-10-12 | End: 2021-12-03

## 2021-11-22 RX ORDER — GABAPENTIN 300 MG/1
CAPSULE ORAL
Qty: 60 CAPSULE | Refills: 0 | Status: SHIPPED | OUTPATIENT
Start: 2021-11-22 | End: 2022-01-03

## 2021-12-03 DIAGNOSIS — I10 ESSENTIAL HYPERTENSION: ICD-10-CM

## 2021-12-03 RX ORDER — HYDROCHLOROTHIAZIDE 12.5 MG/1
TABLET ORAL
Qty: 30 TABLET | Refills: 0 | Status: SHIPPED | OUTPATIENT
Start: 2021-12-03 | End: 2022-01-31

## 2021-12-03 RX ORDER — ATORVASTATIN CALCIUM 20 MG/1
TABLET, FILM COATED ORAL
Qty: 30 TABLET | Refills: 0 | Status: SHIPPED | OUTPATIENT
Start: 2021-12-03 | End: 2022-01-03

## 2022-01-03 RX ORDER — ATORVASTATIN CALCIUM 20 MG/1
TABLET, FILM COATED ORAL
Qty: 30 TABLET | Refills: 0 | Status: SHIPPED | OUTPATIENT
Start: 2022-01-03 | End: 2022-02-18

## 2022-01-03 RX ORDER — GABAPENTIN 300 MG/1
CAPSULE ORAL
Qty: 60 CAPSULE | Refills: 0 | Status: SHIPPED | OUTPATIENT
Start: 2022-01-03 | End: 2022-02-18

## 2022-01-31 DIAGNOSIS — I10 ESSENTIAL HYPERTENSION: ICD-10-CM

## 2022-01-31 RX ORDER — HYDROCHLOROTHIAZIDE 12.5 MG/1
TABLET ORAL
Qty: 30 TABLET | Refills: 0 | Status: SHIPPED | OUTPATIENT
Start: 2022-01-31 | End: 2022-03-18

## 2022-02-18 RX ORDER — ATORVASTATIN CALCIUM 20 MG/1
TABLET, FILM COATED ORAL
Qty: 30 TABLET | Refills: 0 | Status: SHIPPED | OUTPATIENT
Start: 2022-02-18 | End: 2022-03-31

## 2022-02-18 RX ORDER — GABAPENTIN 300 MG/1
CAPSULE ORAL
Qty: 60 CAPSULE | Refills: 0 | Status: SHIPPED | OUTPATIENT
Start: 2022-02-18 | End: 2022-03-31

## 2022-03-18 DIAGNOSIS — I10 ESSENTIAL HYPERTENSION: ICD-10-CM

## 2022-03-18 RX ORDER — HYDROCHLOROTHIAZIDE 12.5 MG/1
TABLET ORAL
Qty: 30 TABLET | Refills: 0 | Status: SHIPPED | OUTPATIENT
Start: 2022-03-18 | End: 2022-03-31

## 2022-03-31 DIAGNOSIS — I10 ESSENTIAL HYPERTENSION: ICD-10-CM

## 2022-03-31 RX ORDER — HYDROCHLOROTHIAZIDE 12.5 MG/1
TABLET ORAL
Qty: 30 TABLET | Refills: 0 | Status: SHIPPED | OUTPATIENT
Start: 2022-03-31 | End: 2022-06-09 | Stop reason: SDUPTHER

## 2022-03-31 RX ORDER — ATORVASTATIN CALCIUM 20 MG/1
TABLET, FILM COATED ORAL
Qty: 30 TABLET | Refills: 0 | Status: SHIPPED | OUTPATIENT
Start: 2022-03-31 | End: 2022-06-09 | Stop reason: SDUPTHER

## 2022-03-31 RX ORDER — GABAPENTIN 300 MG/1
CAPSULE ORAL
Qty: 60 CAPSULE | Refills: 0 | Status: SHIPPED | OUTPATIENT
Start: 2022-03-31 | End: 2022-06-09 | Stop reason: SDUPTHER

## 2022-05-12 DIAGNOSIS — M79.662 PAIN IN LEFT LOWER LEG: ICD-10-CM

## 2022-05-12 DIAGNOSIS — I10 ESSENTIAL HYPERTENSION: ICD-10-CM

## 2022-05-12 RX ORDER — GABAPENTIN 300 MG/1
CAPSULE ORAL
Qty: 60 CAPSULE | Refills: 0 | OUTPATIENT
Start: 2022-05-12

## 2022-05-12 RX ORDER — TIZANIDINE 2 MG/1
TABLET ORAL
Qty: 180 TABLET | Refills: 0 | OUTPATIENT
Start: 2022-05-12

## 2022-05-12 RX ORDER — ATORVASTATIN CALCIUM 20 MG/1
TABLET, FILM COATED ORAL
Qty: 30 TABLET | Refills: 0 | OUTPATIENT
Start: 2022-05-12

## 2022-05-12 RX ORDER — HYDROCHLOROTHIAZIDE 12.5 MG/1
TABLET ORAL
Qty: 30 TABLET | Refills: 0 | OUTPATIENT
Start: 2022-05-12

## 2022-06-09 ENCOUNTER — OFFICE VISIT (OUTPATIENT)
Dept: FAMILY MEDICINE CLINIC | Facility: CLINIC | Age: 52
End: 2022-06-09

## 2022-06-09 VITALS
SYSTOLIC BLOOD PRESSURE: 135 MMHG | DIASTOLIC BLOOD PRESSURE: 91 MMHG | OXYGEN SATURATION: 97 % | HEART RATE: 94 BPM | TEMPERATURE: 98.8 F | HEIGHT: 62 IN | WEIGHT: 194 LBS | BODY MASS INDEX: 35.7 KG/M2 | RESPIRATION RATE: 18 BRPM

## 2022-06-09 DIAGNOSIS — M79.662 PAIN IN LEFT LOWER LEG: ICD-10-CM

## 2022-06-09 DIAGNOSIS — Z11.59 ENCOUNTER FOR HEPATITIS C SCREENING TEST FOR LOW RISK PATIENT: ICD-10-CM

## 2022-06-09 DIAGNOSIS — E55.9 VITAMIN D DEFICIENCY: Primary | ICD-10-CM

## 2022-06-09 DIAGNOSIS — E78.2 MIXED HYPERLIPIDEMIA: ICD-10-CM

## 2022-06-09 DIAGNOSIS — Z12.2 SCREENING FOR LUNG CANCER: ICD-10-CM

## 2022-06-09 DIAGNOSIS — E53.8 VITAMIN B12 DEFICIENCY: ICD-10-CM

## 2022-06-09 DIAGNOSIS — R73.9 HYPERGLYCEMIA: ICD-10-CM

## 2022-06-09 DIAGNOSIS — I10 ESSENTIAL HYPERTENSION: ICD-10-CM

## 2022-06-09 PROCEDURE — 99213 OFFICE O/P EST LOW 20 MIN: CPT | Performed by: NURSE PRACTITIONER

## 2022-06-09 RX ORDER — ATORVASTATIN CALCIUM 20 MG/1
20 TABLET, FILM COATED ORAL DAILY
Qty: 30 TABLET | Refills: 0 | Status: SHIPPED | OUTPATIENT
Start: 2022-06-09 | End: 2022-06-13 | Stop reason: ALTCHOICE

## 2022-06-09 RX ORDER — HYDROCHLOROTHIAZIDE 12.5 MG/1
12.5 TABLET ORAL DAILY
Qty: 30 TABLET | Refills: 0 | Status: SHIPPED | OUTPATIENT
Start: 2022-06-09 | End: 2022-07-13

## 2022-06-09 RX ORDER — GABAPENTIN 300 MG/1
300 CAPSULE ORAL 2 TIMES DAILY
Qty: 60 CAPSULE | Refills: 0 | Status: SHIPPED | OUTPATIENT
Start: 2022-06-09 | End: 2022-07-13

## 2022-06-09 RX ORDER — TIZANIDINE 2 MG/1
4 TABLET ORAL EVERY 8 HOURS PRN
Qty: 180 TABLET | Refills: 0 | Status: SHIPPED | OUTPATIENT
Start: 2022-06-09 | End: 2023-02-17 | Stop reason: SDUPTHER

## 2022-06-09 NOTE — PROGRESS NOTES
"Chief Complaint  Med Refill    Subjective        Karla Farias presents to [facility]  History of Present Illness    Objective   Vital Signs:  /91 (BP Location: Right arm, Patient Position: Sitting, Cuff Size: Adult)   Pulse 94   Temp 98.8 °F (37.1 °C) (Infrared)   Resp 18   Ht 157.5 cm (62\")   Wt 88 kg (194 lb)   SpO2 97%   BMI 35.48 kg/m²   Estimated body mass index is 35.48 kg/m² as calculated from the following:    Height as of this encounter: 157.5 cm (62\").    Weight as of this encounter: 88 kg (194 lb).          Physical Exam  Vitals and nursing note reviewed.   Constitutional:       Appearance: Normal appearance. She is well-developed.   HENT:      Head: Normocephalic and atraumatic.   Eyes:      Conjunctiva/sclera: Conjunctivae normal.      Pupils: Pupils are equal, round, and reactive to light.   Cardiovascular:      Rate and Rhythm: Normal rate and regular rhythm.      Heart sounds: Normal heart sounds.   Pulmonary:      Effort: Pulmonary effort is normal.      Breath sounds: Normal breath sounds.   Abdominal:      General: Bowel sounds are normal.      Palpations: Abdomen is soft.   Musculoskeletal:         General: Normal range of motion.      Cervical back: Normal range of motion and neck supple.   Skin:     General: Skin is warm and dry.   Neurological:      Mental Status: She is alert and oriented to person, place, and time.   Psychiatric:         Behavior: Behavior normal.         Thought Content: Thought content normal.         Judgment: Judgment normal.        Result Review :                Assessment and Plan   Diagnoses and all orders for this visit:    1. Vitamin D deficiency (Primary)  -     Vitamin D 25 Hydroxy; Future    2. Essential hypertension  -     hydroCHLOROthiazide (HYDRODIURIL) 12.5 MG tablet; Take 1 tablet by mouth Daily.  Dispense: 30 tablet; Refill: 0    3. Pain in left lower leg  -     tiZANidine (ZANAFLEX) 2 MG tablet; Take 2 tablets " by mouth Every 8 (Eight) Hours As Needed for Muscle Spasms.  Dispense: 180 tablet; Refill: 0    4. Vitamin B12 deficiency  -     Vitamin B12; Future    5. Mixed hyperlipidemia  -     atorvastatin (LIPITOR) 20 MG tablet; Take 1 tablet by mouth Daily.  Dispense: 30 tablet; Refill: 0  -     gabapentin (NEURONTIN) 300 MG capsule; Take 1 capsule by mouth 2 (Two) Times a Day.  Dispense: 60 capsule; Refill: 0  -     Lipid Panel; Future    6. Hyperglycemia  -     Comprehensive Metabolic Panel; Future  -     TSH; Future  -     T4, Free; Future  -     CBC & Differential; Future  -     Hemoglobin A1c; Future    7. Encounter for hepatitis C screening test for low risk patient  -     Hepatitis C antibody; Future    8. Screening for lung cancer  -     CT Chest Low Dose Wo; Future             Follow Up   Return in about 3 months (around 9/9/2022), or pap smear.  Patient was given instructions and counseling regarding her condition or for health maintenance advice. Please see specific information pulled into the AVS if appropriate.

## 2022-06-10 ENCOUNTER — CLINICAL SUPPORT (OUTPATIENT)
Dept: FAMILY MEDICINE CLINIC | Facility: CLINIC | Age: 52
End: 2022-06-10
Payer: MEDICAID

## 2022-06-10 DIAGNOSIS — Z11.59 ENCOUNTER FOR HEPATITIS C SCREENING TEST FOR LOW RISK PATIENT: ICD-10-CM

## 2022-06-10 DIAGNOSIS — E55.9 VITAMIN D DEFICIENCY: ICD-10-CM

## 2022-06-10 DIAGNOSIS — E53.8 VITAMIN B12 DEFICIENCY: ICD-10-CM

## 2022-06-10 DIAGNOSIS — E78.2 MIXED HYPERLIPIDEMIA: ICD-10-CM

## 2022-06-10 DIAGNOSIS — R73.9 HYPERGLYCEMIA: ICD-10-CM

## 2022-06-10 LAB — HBA1C MFR BLD: 5.4 % (ref 3.5–5.6)

## 2022-06-10 PROCEDURE — 80050 GENERAL HEALTH PANEL: CPT | Performed by: NURSE PRACTITIONER

## 2022-06-10 PROCEDURE — 86803 HEPATITIS C AB TEST: CPT | Performed by: NURSE PRACTITIONER

## 2022-06-10 PROCEDURE — 82607 VITAMIN B-12: CPT | Performed by: NURSE PRACTITIONER

## 2022-06-10 PROCEDURE — 83036 HEMOGLOBIN GLYCOSYLATED A1C: CPT | Performed by: NURSE PRACTITIONER

## 2022-06-10 PROCEDURE — 84439 ASSAY OF FREE THYROXINE: CPT | Performed by: NURSE PRACTITIONER

## 2022-06-10 PROCEDURE — 82306 VITAMIN D 25 HYDROXY: CPT | Performed by: NURSE PRACTITIONER

## 2022-06-10 PROCEDURE — 36415 COLL VENOUS BLD VENIPUNCTURE: CPT | Performed by: NURSE PRACTITIONER

## 2022-06-10 PROCEDURE — 80061 LIPID PANEL: CPT | Performed by: NURSE PRACTITIONER

## 2022-06-11 LAB
25(OH)D3 SERPL-MCNC: 26.1 NG/ML (ref 30–100)
ALBUMIN SERPL-MCNC: 4 G/DL (ref 3.5–5.2)
ALBUMIN/GLOB SERPL: 1.3 G/DL
ALP SERPL-CCNC: 45 U/L (ref 39–117)
ALT SERPL W P-5'-P-CCNC: 10 U/L (ref 1–33)
ANION GAP SERPL CALCULATED.3IONS-SCNC: 9.9 MMOL/L (ref 5–15)
AST SERPL-CCNC: 16 U/L (ref 1–32)
BASOPHILS # BLD AUTO: 0.03 10*3/MM3 (ref 0–0.2)
BASOPHILS NFR BLD AUTO: 0.5 % (ref 0–1.5)
BILIRUB SERPL-MCNC: 0.3 MG/DL (ref 0–1.2)
BUN SERPL-MCNC: 13 MG/DL (ref 6–20)
BUN/CREAT SERPL: 17.6 (ref 7–25)
CALCIUM SPEC-SCNC: 8.8 MG/DL (ref 8.6–10.5)
CHLORIDE SERPL-SCNC: 105 MMOL/L (ref 98–107)
CHOLEST SERPL-MCNC: 254 MG/DL (ref 0–200)
CO2 SERPL-SCNC: 26.1 MMOL/L (ref 22–29)
CREAT SERPL-MCNC: 0.74 MG/DL (ref 0.57–1)
DEPRECATED RDW RBC AUTO: 40.4 FL (ref 37–54)
EGFRCR SERPLBLD CKD-EPI 2021: 99.3 ML/MIN/1.73
EOSINOPHIL # BLD AUTO: 0.17 10*3/MM3 (ref 0–0.4)
EOSINOPHIL NFR BLD AUTO: 2.9 % (ref 0.3–6.2)
ERYTHROCYTE [DISTWIDTH] IN BLOOD BY AUTOMATED COUNT: 13 % (ref 12.3–15.4)
GLOBULIN UR ELPH-MCNC: 3.1 GM/DL
GLUCOSE SERPL-MCNC: 88 MG/DL (ref 65–99)
HCT VFR BLD AUTO: 43.8 % (ref 34–46.6)
HCV AB SER DONR QL: NORMAL
HDLC SERPL-MCNC: 52 MG/DL (ref 40–60)
HGB BLD-MCNC: 14.5 G/DL (ref 12–15.9)
IMM GRANULOCYTES # BLD AUTO: 0.02 10*3/MM3 (ref 0–0.05)
IMM GRANULOCYTES NFR BLD AUTO: 0.3 % (ref 0–0.5)
LDLC SERPL CALC-MCNC: 186 MG/DL (ref 0–100)
LDLC/HDLC SERPL: 3.53 {RATIO}
LYMPHOCYTES # BLD AUTO: 2.38 10*3/MM3 (ref 0.7–3.1)
LYMPHOCYTES NFR BLD AUTO: 41.2 % (ref 19.6–45.3)
MCH RBC QN AUTO: 28.5 PG (ref 26.6–33)
MCHC RBC AUTO-ENTMCNC: 33.1 G/DL (ref 31.5–35.7)
MCV RBC AUTO: 86.2 FL (ref 79–97)
MONOCYTES # BLD AUTO: 0.5 10*3/MM3 (ref 0.1–0.9)
MONOCYTES NFR BLD AUTO: 8.7 % (ref 5–12)
NEUTROPHILS NFR BLD AUTO: 2.68 10*3/MM3 (ref 1.7–7)
NEUTROPHILS NFR BLD AUTO: 46.4 % (ref 42.7–76)
NRBC BLD AUTO-RTO: 0 /100 WBC (ref 0–0.2)
PLATELET # BLD AUTO: 240 10*3/MM3 (ref 140–450)
PMV BLD AUTO: 11.2 FL (ref 6–12)
POTASSIUM SERPL-SCNC: 4 MMOL/L (ref 3.5–5.2)
PROT SERPL-MCNC: 7.1 G/DL (ref 6–8.5)
RBC # BLD AUTO: 5.08 10*6/MM3 (ref 3.77–5.28)
SODIUM SERPL-SCNC: 141 MMOL/L (ref 136–145)
T4 FREE SERPL-MCNC: 1.18 NG/DL (ref 0.93–1.7)
TRIGL SERPL-MCNC: 92 MG/DL (ref 0–150)
TSH SERPL DL<=0.05 MIU/L-ACNC: 2.52 UIU/ML (ref 0.27–4.2)
VIT B12 BLD-MCNC: 444 PG/ML (ref 211–946)
VLDLC SERPL-MCNC: 16 MG/DL (ref 5–40)
WBC NRBC COR # BLD: 5.78 10*3/MM3 (ref 3.4–10.8)

## 2022-06-13 DIAGNOSIS — E55.9 VITAMIN D DEFICIENCY: Primary | ICD-10-CM

## 2022-06-13 DIAGNOSIS — E78.2 MIXED HYPERLIPIDEMIA: ICD-10-CM

## 2022-06-13 RX ORDER — ERGOCALCIFEROL 1.25 MG/1
50000 CAPSULE ORAL
Qty: 8 CAPSULE | Refills: 0 | Status: SHIPPED | OUTPATIENT
Start: 2022-06-13 | End: 2022-08-02

## 2022-06-13 RX ORDER — ATORVASTATIN CALCIUM 40 MG/1
40 TABLET, FILM COATED ORAL DAILY
Qty: 90 TABLET | Refills: 0 | Status: SHIPPED | OUTPATIENT
Start: 2022-06-13 | End: 2022-11-03

## 2022-07-06 ENCOUNTER — HOSPITAL ENCOUNTER (OUTPATIENT)
Dept: CT IMAGING | Facility: HOSPITAL | Age: 52
Discharge: HOME | End: 2022-07-06
Admitting: NURSE PRACTITIONER

## 2022-07-06 DIAGNOSIS — Z12.2 SCREENING FOR LUNG CANCER: ICD-10-CM

## 2022-07-06 PROCEDURE — 71271 CT THORAX LUNG CANCER SCR C-: CPT

## 2022-07-08 ENCOUNTER — OFFICE VISIT (OUTPATIENT)
Dept: FAMILY MEDICINE CLINIC | Facility: CLINIC | Age: 52
End: 2022-07-08

## 2022-07-08 VITALS
WEIGHT: 193 LBS | DIASTOLIC BLOOD PRESSURE: 76 MMHG | HEART RATE: 81 BPM | OXYGEN SATURATION: 98 % | TEMPERATURE: 98.4 F | HEIGHT: 62 IN | SYSTOLIC BLOOD PRESSURE: 107 MMHG | BODY MASS INDEX: 35.51 KG/M2

## 2022-07-08 DIAGNOSIS — I10 ESSENTIAL HYPERTENSION: Primary | ICD-10-CM

## 2022-07-08 DIAGNOSIS — E78.2 MIXED HYPERLIPIDEMIA: ICD-10-CM

## 2022-07-08 PROCEDURE — 99212 OFFICE O/P EST SF 10 MIN: CPT | Performed by: NURSE PRACTITIONER

## 2022-07-13 DIAGNOSIS — E78.2 MIXED HYPERLIPIDEMIA: ICD-10-CM

## 2022-07-13 DIAGNOSIS — I10 ESSENTIAL HYPERTENSION: ICD-10-CM

## 2022-07-13 RX ORDER — GABAPENTIN 300 MG/1
CAPSULE ORAL
Qty: 60 CAPSULE | Refills: 0 | Status: SHIPPED | OUTPATIENT
Start: 2022-07-13 | End: 2022-08-15

## 2022-07-13 RX ORDER — HYDROCHLOROTHIAZIDE 12.5 MG/1
TABLET ORAL
Qty: 30 TABLET | Refills: 0 | Status: SHIPPED | OUTPATIENT
Start: 2022-07-13 | End: 2022-08-15

## 2022-08-05 NOTE — PROGRESS NOTES
"Chief Complaint  Hyperlipidemia, Hypertension, and Follow-up    Subjective        Karla Farias presents to [facility] PRIMARY CARE  History of Present Illness  F/u for labs results and refills and HTN and HLD  Reports doing well no concerns  Denies chest pain or shortness of air   Objective   Vital Signs:  /76 (BP Location: Left arm, Patient Position: Sitting, Cuff Size: Adult)   Pulse 81   Temp 98.4 °F (36.9 °C) (Temporal)   Ht 157.5 cm (62\")   Wt 87.5 kg (193 lb)   SpO2 98%   BMI 35.30 kg/m²   Estimated body mass index is 35.3 kg/m² as calculated from the following:    Height as of this encounter: 157.5 cm (62\").    Weight as of this encounter: 87.5 kg (193 lb).          Physical Exam  Vitals and nursing note reviewed.   Constitutional:       Appearance: Normal appearance. She is well-developed.   HENT:      Head: Normocephalic and atraumatic.   Eyes:      Conjunctiva/sclera: Conjunctivae normal.      Pupils: Pupils are equal, round, and reactive to light.   Cardiovascular:      Rate and Rhythm: Normal rate and regular rhythm.      Heart sounds: Normal heart sounds.   Pulmonary:      Effort: Pulmonary effort is normal.      Breath sounds: Normal breath sounds.   Abdominal:      General: Bowel sounds are normal.      Palpations: Abdomen is soft.   Musculoskeletal:         General: Normal range of motion.      Cervical back: Normal range of motion and neck supple.   Skin:     General: Skin is warm and dry.   Neurological:      Mental Status: She is alert and oriented to person, place, and time.   Psychiatric:         Behavior: Behavior normal.         Thought Content: Thought content normal.         Judgment: Judgment normal.        Result Review :                Assessment and Plan   Diagnoses and all orders for this visit:    1. Essential hypertension (Primary)    2. Mixed hyperlipidemia             Follow Up   No follow-ups on file.  Patient was given instructions and counseling " regarding her condition or for health maintenance advice. Please see specific information pulled into the AVS if appropriate.

## 2022-08-15 DIAGNOSIS — E78.2 MIXED HYPERLIPIDEMIA: ICD-10-CM

## 2022-08-15 DIAGNOSIS — I10 ESSENTIAL HYPERTENSION: ICD-10-CM

## 2022-08-15 RX ORDER — GABAPENTIN 300 MG/1
CAPSULE ORAL
Qty: 60 CAPSULE | Refills: 0 | Status: SHIPPED | OUTPATIENT
Start: 2022-08-15 | End: 2022-09-28

## 2022-08-15 RX ORDER — HYDROCHLOROTHIAZIDE 12.5 MG/1
TABLET ORAL
Qty: 30 TABLET | Refills: 0 | Status: SHIPPED | OUTPATIENT
Start: 2022-08-15 | End: 2022-09-28

## 2022-09-28 DIAGNOSIS — I10 ESSENTIAL HYPERTENSION: ICD-10-CM

## 2022-09-28 DIAGNOSIS — E78.2 MIXED HYPERLIPIDEMIA: ICD-10-CM

## 2022-09-28 RX ORDER — HYDROCHLOROTHIAZIDE 12.5 MG/1
TABLET ORAL
Qty: 30 TABLET | Refills: 0 | Status: SHIPPED | OUTPATIENT
Start: 2022-09-28 | End: 2022-11-03

## 2022-09-28 RX ORDER — GABAPENTIN 300 MG/1
CAPSULE ORAL
Qty: 60 CAPSULE | Refills: 0 | Status: SHIPPED | OUTPATIENT
Start: 2022-09-28 | End: 2022-11-03

## 2022-11-03 DIAGNOSIS — E78.2 MIXED HYPERLIPIDEMIA: ICD-10-CM

## 2022-11-03 DIAGNOSIS — I10 ESSENTIAL HYPERTENSION: ICD-10-CM

## 2022-11-03 RX ORDER — ATORVASTATIN CALCIUM 40 MG/1
TABLET, FILM COATED ORAL
Qty: 90 TABLET | Refills: 0 | Status: SHIPPED | OUTPATIENT
Start: 2022-11-03 | End: 2023-02-17 | Stop reason: SDUPTHER

## 2022-11-03 RX ORDER — GABAPENTIN 300 MG/1
CAPSULE ORAL
Qty: 60 CAPSULE | Refills: 0 | Status: SHIPPED | OUTPATIENT
Start: 2022-11-03 | End: 2023-02-17 | Stop reason: SDUPTHER

## 2022-11-03 RX ORDER — HYDROCHLOROTHIAZIDE 12.5 MG/1
TABLET ORAL
Qty: 30 TABLET | Refills: 0 | Status: SHIPPED | OUTPATIENT
Start: 2022-11-03 | End: 2023-02-17 | Stop reason: SDUPTHER

## 2022-11-03 NOTE — TELEPHONE ENCOUNTER
Rx Refill Note  Requested Prescriptions     Pending Prescriptions Disp Refills   • gabapentin (NEURONTIN) 300 MG capsule [Pharmacy Med Name: Gabapentin 300 MG Oral Capsule] 60 capsule 0     Sig: Take 1 capsule by mouth twice daily     Signed Prescriptions Disp Refills   • hydroCHLOROthiazide (HYDRODIURIL) 12.5 MG tablet 30 tablet 0     Sig: Take 1 tablet by mouth once daily     Authorizing Provider: REJI LEMUS     Ordering User: IDANIA PALUMBO   • atorvastatin (LIPITOR) 40 MG tablet 90 tablet 0     Sig: Take 1 tablet by mouth once daily     Authorizing Provider: REJI LEMUS     Ordering User: IDANIA PALUMBO      Last office visit with prescribing clinician: 7/8/2022      Next office visit with prescribing clinician: Visit date not found            Idania Palumbo MA  11/03/22, 13:21 EDT

## 2023-01-16 NOTE — PROGRESS NOTES
Venipuncture Blood Specimen Collection  Venipuncture performed in (L) arm via butterfly  by Leidy Lucas LPN with good hemostasis. Patient tolerated the procedure well without complications.   06.10.2022   Leidy Lucas LPN

## 2023-02-17 DIAGNOSIS — M79.662 PAIN IN LEFT LOWER LEG: ICD-10-CM

## 2023-02-17 DIAGNOSIS — I10 ESSENTIAL HYPERTENSION: ICD-10-CM

## 2023-02-17 DIAGNOSIS — E78.2 MIXED HYPERLIPIDEMIA: ICD-10-CM

## 2023-02-20 RX ORDER — TIZANIDINE 2 MG/1
4 TABLET ORAL EVERY 8 HOURS PRN
Qty: 180 TABLET | Refills: 0 | Status: SHIPPED | OUTPATIENT
Start: 2023-02-20

## 2023-02-20 RX ORDER — ATORVASTATIN CALCIUM 40 MG/1
40 TABLET, FILM COATED ORAL DAILY
Qty: 90 TABLET | Refills: 0 | Status: SHIPPED | OUTPATIENT
Start: 2023-02-20

## 2023-02-20 RX ORDER — HYDROCHLOROTHIAZIDE 12.5 MG/1
12.5 TABLET ORAL DAILY
Qty: 30 TABLET | Refills: 0 | Status: SHIPPED | OUTPATIENT
Start: 2023-02-20

## 2023-02-20 RX ORDER — GABAPENTIN 300 MG/1
300 CAPSULE ORAL 2 TIMES DAILY
Qty: 60 CAPSULE | Refills: 0 | Status: SHIPPED | OUTPATIENT
Start: 2023-02-20

## 2023-02-20 NOTE — TELEPHONE ENCOUNTER
Rx Refill Note  Requested Prescriptions     Pending Prescriptions Disp Refills   • tiZANidine (ZANAFLEX) 2 MG tablet 180 tablet 0     Sig: Take 2 tablets by mouth Every 8 (Eight) Hours As Needed for Muscle Spasms.   • gabapentin (NEURONTIN) 300 MG capsule 60 capsule 0     Sig: Take 1 capsule by mouth 2 (Two) Times a Day.     Signed Prescriptions Disp Refills   • atorvastatin (LIPITOR) 40 MG tablet 90 tablet 0     Sig: Take 1 tablet by mouth Daily.     Authorizing Provider: REJI LEMUS     Ordering User: IDANIA PALUMBO      Last office visit with prescribing clinician: 7/8/2022   Last telemedicine visit with prescribing clinician: Visit date not found   Next office visit with prescribing clinician: Visit date not found     JO - SCAN - INSPECT/[facility]/2-20-23 (02/20/2023)                      Would you like a call back once the refill request has been completed: [] Yes [] No    If the office needs to give you a call back, can they leave a voicemail: [] Yes [] No    Idania Palumbo MA  02/20/23, 08:45 EST

## 2024-08-07 ENCOUNTER — TELEPHONE (OUTPATIENT)
Dept: FAMILY MEDICINE CLINIC | Facility: CLINIC | Age: 54
End: 2024-08-07
Payer: MEDICAID

## 2024-08-07 NOTE — TELEPHONE ENCOUNTER
ning    Caller: Karla Farias    Relationship to patient: Self    Best call back number: [phone]    Chief complaint: NUMBNESS. TINGLING IN FINGERS    Patient directed to call 911 or go to their nearest emergency room.     Patient verbalized understanding: [x] Yes  [] No  If no, why?

## 2024-08-11 ENCOUNTER — HOSPITAL ENCOUNTER (EMERGENCY)
Facility: HOSPITAL | Age: 54
Discharge: HOME | End: 2024-08-11
Admitting: EMERGENCY MEDICINE
Payer: COMMERCIAL

## 2024-08-11 ENCOUNTER — APPOINTMENT (OUTPATIENT)
Dept: CARDIOLOGY | Facility: HOSPITAL | Age: 54
End: 2024-08-11
Payer: COMMERCIAL

## 2024-08-11 VITALS
HEIGHT: 64 IN | RESPIRATION RATE: 18 BRPM | BODY MASS INDEX: 32.95 KG/M2 | SYSTOLIC BLOOD PRESSURE: 139 MMHG | DIASTOLIC BLOOD PRESSURE: 98 MMHG | HEART RATE: 85 BPM | OXYGEN SATURATION: 97 % | WEIGHT: 193 LBS | TEMPERATURE: 97.9 F

## 2024-08-11 DIAGNOSIS — M62.838 MUSCLE SPASM: ICD-10-CM

## 2024-08-11 DIAGNOSIS — M79.602 LEFT ARM PAIN: Primary | ICD-10-CM

## 2024-08-11 LAB
BH CV UPPER VENOUS LEFT AXILLARY AUGMENT: NORMAL
BH CV UPPER VENOUS LEFT AXILLARY COMPRESS: NORMAL
BH CV UPPER VENOUS LEFT AXILLARY PHASIC: NORMAL
BH CV UPPER VENOUS LEFT AXILLARY SPONT: NORMAL
BH CV UPPER VENOUS LEFT BASILIC FOREARM COMPRESS: NORMAL
BH CV UPPER VENOUS LEFT BASILIC UPPER COMPRESS: NORMAL
BH CV UPPER VENOUS LEFT BRACHIAL COMPRESS: NORMAL
BH CV UPPER VENOUS LEFT CEPHALIC FOREARM COMPRESS: NORMAL
BH CV UPPER VENOUS LEFT CEPHALIC UPPER COMPRESS: NORMAL
BH CV UPPER VENOUS LEFT INTERNAL JUGULAR AUGMENT: NORMAL
BH CV UPPER VENOUS LEFT INTERNAL JUGULAR COMPRESS: NORMAL
BH CV UPPER VENOUS LEFT INTERNAL JUGULAR PHASIC: NORMAL
BH CV UPPER VENOUS LEFT INTERNAL JUGULAR SPONT: NORMAL
BH CV UPPER VENOUS LEFT RADIAL COMPRESS: NORMAL
BH CV UPPER VENOUS LEFT SUBCLAVIAN AUGMENT: NORMAL
BH CV UPPER VENOUS LEFT SUBCLAVIAN COMPRESS: NORMAL
BH CV UPPER VENOUS LEFT SUBCLAVIAN PHASIC: NORMAL
BH CV UPPER VENOUS LEFT SUBCLAVIAN SPONT: NORMAL
BH CV UPPER VENOUS LEFT ULNAR COMPRESS: NORMAL
BH CV UPPER VENOUS RIGHT INTERNAL JUGULAR AUGMENT: NORMAL
BH CV UPPER VENOUS RIGHT INTERNAL JUGULAR COMPRESS: NORMAL
BH CV UPPER VENOUS RIGHT INTERNAL JUGULAR PHASIC: NORMAL
BH CV UPPER VENOUS RIGHT INTERNAL JUGULAR SPONT: NORMAL
BH CV UPPER VENOUS RIGHT SUBCLAVIAN AUGMENT: NORMAL
BH CV UPPER VENOUS RIGHT SUBCLAVIAN COMPRESS: NORMAL
BH CV UPPER VENOUS RIGHT SUBCLAVIAN PHASIC: NORMAL
BH CV UPPER VENOUS RIGHT SUBCLAVIAN SPONT: NORMAL
BH CV VAS PRELIMINARY FINDINGS SCRIPTING: 1

## 2024-08-11 PROCEDURE — 99284 EMERGENCY DEPT VISIT MOD MDM: CPT

## 2024-08-11 PROCEDURE — 93971 EXTREMITY STUDY: CPT

## 2024-08-11 PROCEDURE — 93971 EXTREMITY STUDY: CPT | Performed by: SURGERY

## 2024-08-11 RX ORDER — DICLOFENAC SODIUM 75 MG/1
75 TABLET, DELAYED RELEASE ORAL 2 TIMES DAILY
Qty: 14 TABLET | Refills: 0 | Status: SHIPPED | OUTPATIENT
Start: 2024-08-11 | End: 2024-08-18

## 2024-08-11 NOTE — Clinical Note
[facility] EMERGENCY DEPARTMENT  [address] [place] [address]  Phone: [phone]    Karla Farias was seen and treated in our emergency department on 8/11/2024.  She may return to work on 08/14/2024.         Thank you for choosing [facility].    Holly Johansen, TOSHIA

## 2024-08-11 NOTE — ED PROVIDER NOTES
Subjective   History of Present Illness  Patient is a 53-year-old  female with history of hypertension who presents with complaints of left arm pain that she states occurs just proximal to her elbow that has been ongoing for a week or 2 and feels like something is wrong with her vein.  Patient does report smoking and is having a hard time lifting.  She denies any known injury or trauma.      Review of Systems   Constitutional:  Negative for appetite change and fever.   HENT:  Negative for congestion and rhinorrhea.    Cardiovascular:  Negative for chest pain.   Gastrointestinal:  Negative for abdominal pain and nausea.   Genitourinary:  Negative for dysuria.   Musculoskeletal:  Positive for myalgias.   Psychiatric/Behavioral:  The patient is not nervous/anxious.    All other systems reviewed and are negative.      Past Medical History:   Diagnosis Date    Arthritis     Hyperlipidemia     Hypertension        No Known Allergies    Past Surgical History:   Procedure Laterality Date     SECTION      x2    LASER ABLATION OF THE CERVIX  2019       Family History   Problem Relation Age of Onset    Brain cancer Mother     Lung cancer Father     Hypertension Father     Heart failure Father     Emphysema Father     Diabetes Sister     Hypertension Maternal Grandmother     Hyperlipidemia Maternal Grandmother     Alzheimer's disease Paternal Grandmother     Heart failure Paternal Grandfather        Social History     Socioeconomic History    Marital status:    Tobacco Use    Smoking status: Every Day     Current packs/day: 1.00     Average packs/day: 1 pack/day for 34.6 years (34.6 ttl pk-yrs)     Types: Cigarettes     Start date:     Smokeless tobacco: Never   Vaping Use    Vaping status: Never Used   Substance and Sexual Activity    Alcohol use: Yes     Alcohol/week: 1.0 standard drink of alcohol     Types: 1 Cans of beer per week    Drug use: Never    Sexual activity: Defer           Objective  "  Physical Exam  Vitals and nursing note reviewed.   Constitutional:       General: She is not in acute distress.     Appearance: Normal appearance. She is not ill-appearing.   HENT:      Head: Normocephalic and atraumatic.   Eyes:      Pupils: Pupils are equal, round, and reactive to light.   Cardiovascular:      Rate and Rhythm: Normal rate and regular rhythm.      Heart sounds: Normal heart sounds. No murmur heard.  Pulmonary:      Effort: No respiratory distress.      Breath sounds: Normal breath sounds.   Abdominal:      General: Bowel sounds are normal.      Tenderness: There is no abdominal tenderness.   Musculoskeletal:         General: Normal range of motion.   Neurological:      Mental Status: She is alert and oriented to person, place, and time.         Procedures           ED Course      /98 (BP Location: Right arm, Patient Position: Sitting)   Pulse 85   Temp 97.9 °F (36.6 °C) (Oral)   Resp 18   Ht 162.6 cm (64\")   Wt 87.5 kg (193 lb)   SpO2 97%   BMI 33.13 kg/m²   Labs Reviewed - No data to display  Medications - No data to display  No radiology results for the last day                                         Medical Decision Making  Patient is a 53-year-old obese  female with a long-term smoking history who presents with complaints of left arm pain that has been ongoing for the past week or 2.  She states that the pain occurs sporadically with no R/a factors.  On exam, there is no significant swelling, deformity, ecchymosis or erythema to the painful site.  She does report some tenderness on palpation to her left upper arm just proximal to her AC space.  She has normal S1-2 without clicks murmurs.  No JVD or leg swelling.  Lungs are clear to auscultation in all fields.  Good strength and strong equal bilaterally.  Patient has good cap refill.  Lungs are clear to auscultation.   initial differentials include DVT, musculoskeletal strain, arthritis.  This is not a complete " list.    Patient was evaluated by provider in triage, who placed initial orders for Doppler study, this study was found to be negative.  Upon my reassessment, there is no evidence of cellulitis, eccymosis, crepitus or dislocation.  Patient has full range of motion and there is no unilateral weakness.  Patient will need to be evaluated by vascular surgery on outpatient basis for further rule out but at this time I do not believe she requires further ER evaluation.  This was discussed with patient and she is agreeable.  She was given prescription of Voltaren for pain and inflammation and encouraged to alternate use of ice and heat.  She verbalized understanding and is agreeable.  Patient is ambulatory upright, steadily without assistance upon discharge.  No acute distress noted.  I discussed the findings with patient who voices understanding of discharge instructions, signs and symptoms requiring return to the ED; discharged improved and stable condition with follow-up for reevaluation.    Patient is aware that discharge does not mean that nothing is wrong but it indicates no emergency is present and they must continue care with follow-up as given below or physician of their choice.    This document is intended for medical expert use only.  Reading of this document by patients and/or patient's family without participating medical staff guidance may result in misinterpretation and unintended morbidity.  Any interpretation of such data is the responsibility of the patient and/or family member responsible for the patient in concert with their primary or specialist providers, not to be left for sources of online search as such as [employer], [employer] or similar queries.  Relying on these approaches to knowledge may result in misinterpretation, misguided goals of care and even death should patient or family members try recommendations outside of the realm of professional medical care in a supervised inpatient environment.    This  medical document was created using Dragon dictation system. Some errors in speech recognition may occur.        Final diagnoses:   Left arm pain   Muscle spasm       ED Disposition  ED Disposition       ED Disposition   Discharge    Condition   Stable    Comment   --               Osvaldo Brooks, TOSHIA  [address] [facility] [address] [facility] [place] [address]  [phone]          Fady Balderrama II, MD  [address] [place] [address] [address]  [place] [address]  [phone]               Medication List        New Prescriptions      diclofenac 75 MG EC tablet  Commonly known as: VOLTAREN  Take 1 tablet by mouth 2 (Two) Times a Day for 7 days.               Where to Get Your Medications        These medications were sent to [facility] [address] [place] [address] [place] - [address] - [phone]  - [phone] FX  [address] [place] [address]      Phone: [phone]   diclofenac 75 MG EC tablet            Holly Johansen, TOSHIA  08/11/24 [id number]

## 2024-08-11 NOTE — DISCHARGE INSTRUCTIONS
Take Voltaren as needed for discomfort.  Apply ice knee for 20 minutes at a time several times a day as needed.  Keep arm elevated when not in use.  Rest.      Follow-up with vascular surgery for further evaluation and management.  Follow-up with primary care provider as needed.      Return to the ER for new or worse concerns.

## 2024-08-11 NOTE — Clinical Note
[facility] EMERGENCY DEPARTMENT  [address] [facility] [place] [address]  Phone: [phone]    Karla Farias was seen and treated in our emergency department on 8/11/2024.  She may return to work on 08/14/2024.         Thank you for choosing [facility].    Holly Johansen, TOSHIA

## 2024-08-11 NOTE — Clinical Note
[facility] EMERGENCY DEPARTMENT  [address] [facility] [place] [address]  Phone: [phone]    Karla Farias was seen and treated in our emergency department on 8/11/2024.  She may return to work on 08/14/2024.         Thank you for choosing [facility].    Holly Johansen, TOSHIA

## 2024-08-20 ENCOUNTER — OFFICE VISIT (OUTPATIENT)
Age: 54
End: 2024-08-20
Payer: COMMERCIAL

## 2024-08-20 VITALS
SYSTOLIC BLOOD PRESSURE: 167 MMHG | WEIGHT: 193 LBS | DIASTOLIC BLOOD PRESSURE: 95 MMHG | HEIGHT: 64 IN | BODY MASS INDEX: 32.95 KG/M2

## 2024-08-20 DIAGNOSIS — M79.602 PAIN OF LEFT UPPER EXTREMITY: Primary | ICD-10-CM

## 2024-08-20 DIAGNOSIS — Z72.0 TOBACCO ABUSE: ICD-10-CM

## 2024-08-20 PROCEDURE — 99203 OFFICE O/P NEW LOW 30 MIN: CPT | Performed by: STUDENT IN AN ORGANIZED HEALTH CARE EDUCATION/TRAINING PROGRAM

## 2024-08-20 NOTE — PROGRESS NOTES
"Chief Complaint  Peripheral Vascular Disease (Hospital follow up)    New patient for arm pain    Subjective        Karla Farias presents to [facility] VASCULAR SURGERY  History of Present Illness    Patient is a 53 year old lady who is referred to us for evaluation by the ER after presenting to the ER with left arm pain on 8.11.24.   Venous duplex at that time showed no deep or superficial vein thrombosis.     Patient reports the pain is in her distal left bicep and radiates down to her fingers with a sharp pain but also is associate with some numbness and tingling.    She reports it has been going on for months but has lately been improving.    She denies ever having any kind of surgeries on any blood vessels.  She previously had blood clots in her legs that sounds like superficial thrombophlebitis based on the patient's description of them when she was pregnant 20 years ago, and was on blood thinners for these but has been off of blood thinners for several years.        Objective   Vital Signs:  /95 (BP Location: Right arm, Patient Position: Sitting)   Ht 162.6 cm (64\")   Wt 87.5 kg (193 lb)   BMI 33.13 kg/m²   Estimated body mass index is 33.13 kg/m² as calculated from the following:    Height as of this encounter: 162.6 cm (64\").    Weight as of this encounter: 87.5 kg (193 lb).         BMI is >= 30 and <35. (Class 1 Obesity). The following options were offered after discussion;: information on healthy weight added to patient's after visit summary          Physical Exam   NAD  NACT  RRR  Respirations unlabored  Palpable radial and brachial pulses bilaterally that are equal bilaterally.  Mild tenderness on palpation of trapezius muscle.  Mild tenderness on palpation of distal biceps muscle.  No palpable mass or external evidence of trauma.  No edema  Result Review :            I have independently reviewed and interpreted the images from the patient's venous duplex from 8/11/2024 as " well as her low-dose CT of her chest from 7/6/2022.  No left arm DVT on venous duplex.  CT chest shows minimal atherosclerotic plaque of the aortic arch and proximal great vessels.         Assessment and Plan     53-year-old lady referred by the ER after presenting there with arm pain.    Patient has normal vascular exam of both of her upper extremities and a normal venous duplex.  I do not think there is a vascular cause of the pain.  The patient's description of the pain sounds more musculoskeletal or perhaps neurogenic    Will check a CT scan of the patient's left arm and go from there.    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    If CT scan is normal then I will probably defer back to primary care to be worked up for possible neuropathic causes of pain.        Diagnoses and all orders for this visit:    1. Pain of left upper extremity (Primary)  -     CT Upper Extremity Left With Contrast; Future    2. Tobacco abuse              Patient BMI noted. Educational material for patient for health risks of being overweight added to patient's after visit summary.           Follow Up     Return in about 4 weeks (around 9/17/2024).  Patient was given instructions and counseling regarding her condition or for health maintenance advice. Please see specific information pulled into the AVS if appropriate.

## 2024-12-30 ENCOUNTER — TELEPHONE (OUTPATIENT)
Dept: FAMILY MEDICINE CLINIC | Facility: CLINIC | Age: 54
End: 2024-12-30
Payer: COMMERCIAL

## 2024-12-31 NOTE — TELEPHONE ENCOUNTER
Caller: Karla Farias    Relationship to patient: Self    Best call back number:   Telephone Information:   Mobile [phone]         Chief complaint: SEVERE HEADACHE AFFECTING VISION     Patient directed to call 911 or go to their nearest emergency room.     Patient verbalized understanding: [x] Yes  [] No  If no, why?        
PT HAS SUKHJINDER ON 01-02-25 WITH DR GOETZ  
12-Jan-2018

## 2025-01-02 ENCOUNTER — OFFICE VISIT (OUTPATIENT)
Dept: FAMILY MEDICINE CLINIC | Facility: CLINIC | Age: 55
End: 2025-01-02
Payer: COMMERCIAL

## 2025-01-02 VITALS
RESPIRATION RATE: 20 BRPM | HEIGHT: 64 IN | HEART RATE: 92 BPM | OXYGEN SATURATION: 97 % | BODY MASS INDEX: 34.69 KG/M2 | WEIGHT: 203.2 LBS | SYSTOLIC BLOOD PRESSURE: 168 MMHG | DIASTOLIC BLOOD PRESSURE: 91 MMHG | TEMPERATURE: 97.7 F

## 2025-01-02 DIAGNOSIS — H61.23 BILATERAL IMPACTED CERUMEN: ICD-10-CM

## 2025-01-02 DIAGNOSIS — I10 BENIGN HYPERTENSION: ICD-10-CM

## 2025-01-02 DIAGNOSIS — F17.210 TOBACCO DEPENDENCE DUE TO CIGARETTES: ICD-10-CM

## 2025-01-02 DIAGNOSIS — G44.53 THUNDERCLAP HEADACHE: Primary | ICD-10-CM

## 2025-01-02 PROCEDURE — 99214 OFFICE O/P EST MOD 30 MIN: CPT | Performed by: FAMILY MEDICINE

## 2025-01-02 RX ORDER — METOPROLOL SUCCINATE 25 MG/1
25 TABLET, EXTENDED RELEASE ORAL DAILY
Qty: 30 TABLET | Refills: 0 | Status: SHIPPED | OUTPATIENT
Start: 2025-01-02

## 2025-01-02 NOTE — PROGRESS NOTES
"Chief Complaint  No chief complaint on file.    History of Present Illness   New onset cephalgia.  Patient states that she was looking on her cell phone, on 24 when she had an intense headache explode in the frontal and left temporal region of her head.  She noted her vision became blurred and she was nauseated.  This was the first occurrence.  She noted her BP was 170's/ 100.  She took Tylenol and Advil.  The headache eased by lasted all day.  Another episode occurred the following day.  She went to [facility] ER and it was noted that her BP was 189/107.  She felt pressure in her sinus.  No medication was administered.  Her BP decreased to 141/82.  She was discharged with the diagnosis of sinus headache.  She notes that she has had no symptoms of respiratory illness.  She smokes 1 PPD. She does not have a headache today and her vision is not blurry.    Objective     Vital Signs:   /91 (BP Location: Left arm, Patient Position: Sitting, Cuff Size: Large Adult)   Pulse 92   Temp 97.7 °F (36.5 °C) (Oral)   Resp 20   Ht 162.6 cm (64.02\")   Wt 92.2 kg (203 lb 3.2 oz)   SpO2 97%   BMI 34.86 kg/m²   Current Outpatient Medications on File Prior to Visit   Medication Sig Dispense Refill    atorvastatin (LIPITOR) 40 MG tablet Take 1 tablet by mouth Daily. 90 tablet 0    gabapentin (NEURONTIN) 300 MG capsule Take 1 capsule by mouth 2 (Two) Times a Day. 60 capsule 0    hydroCHLOROthiazide (HYDRODIURIL) 12.5 MG tablet Take 1 tablet by mouth Daily. 30 tablet 0    tiZANidine (ZANAFLEX) 2 MG tablet Take 2 tablets by mouth Every 8 (Eight) Hours As Needed for Muscle Spasms. 180 tablet 0     No current facility-administered medications on file prior to visit.        Past Medical History:   Diagnosis Date    Arthritis     Hyperlipidemia     Hypertension       Past Surgical History:   Procedure Laterality Date     SECTION      x2    LASER ABLATION OF THE CERVIX  2019      Family History   Problem Relation Age of " Onset    Brain cancer Mother     Lung cancer Father     Hypertension Father     Heart failure Father     Emphysema Father     Diabetes Sister     Hypertension Maternal Grandmother     Hyperlipidemia Maternal Grandmother     Alzheimer's disease Paternal Grandmother     Heart failure Paternal Grandfather       Social History     Socioeconomic History    Marital status:    Tobacco Use    Smoking status: Every Day     Current packs/day: 1.00     Average packs/day: 1 pack/day for 35.0 years (35.0 ttl pk-yrs)     Types: Cigarettes     Start date: 1990    Smokeless tobacco: Never   Vaping Use    Vaping status: Never Used   Substance and Sexual Activity    Alcohol use: Yes     Alcohol/week: 1.0 standard drink of alcohol     Types: 1 Cans of beer per week    Drug use: Never    Sexual activity: Defer         No visits with results within 3 Month(s) from this visit.   Latest known visit with results is:   Clinical Support on 06/10/2022   Component Date Value Ref Range Status    Glucose 06/10/2022 88  65 - 99 mg/dL Final    BUN 06/10/2022 13  6 - 20 mg/dL Final    Creatinine 06/10/2022 0.74  0.57 - 1.00 mg/dL Final    Sodium 06/10/2022 141  136 - 145 mmol/L Final    Potassium 06/10/2022 4.0  3.5 - 5.2 mmol/L Final    Chloride 06/10/2022 105  98 - 107 mmol/L Final    CO2 06/10/2022 26.1  22.0 - 29.0 mmol/L Final    Calcium 06/10/2022 8.8  8.6 - 10.5 mg/dL Final    Total Protein 06/10/2022 7.1  6.0 - 8.5 g/dL Final    Albumin 06/10/2022 4.00  3.50 - 5.20 g/dL Final    ALT (SGPT) 06/10/2022 10  1 - 33 U/L Final    AST (SGOT) 06/10/2022 16  1 - 32 U/L Final    Alkaline Phosphatase 06/10/2022 45  39 - 117 U/L Final    Total Bilirubin 06/10/2022 0.3  0.0 - 1.2 mg/dL Final    Globulin 06/10/2022 3.1  gm/dL Final    A/G Ratio 06/10/2022 1.3  g/dL Final    BUN/Creatinine Ratio 06/10/2022 17.6  7.0 - 25.0 Final    Anion Gap 06/10/2022 9.9  5.0 - 15.0 mmol/L Final    eGFR 06/10/2022 99.3  >60.0 mL/min/1.73 Final    National Kidney  Foundation and American Society of Nephrology (ASN) Task Force recommended calculation based on the Chronic Kidney Disease Epidemiology Collaboration (CKD-EPI) equation refit without adjustment for race.    Total Cholesterol 06/10/2022 254 (H)  0 - 200 mg/dL Final    Triglycerides 06/10/2022 92  0 - 150 mg/dL Final    HDL Cholesterol 06/10/2022 52  40 - 60 mg/dL Final    LDL Cholesterol  06/10/2022 186 (H)  0 - 100 mg/dL Final    VLDL Cholesterol 06/10/2022 16  5 - 40 mg/dL Final    LDL/HDL Ratio 06/10/2022 3.53   Final    TSH 06/10/2022 2.520  0.270 - 4.200 uIU/mL Final    Free T4 06/10/2022 1.18  0.93 - 1.70 ng/dL Final    Vitamin B-12 06/10/2022 444  211 - 946 pg/mL Final    25 Hydroxy, Vitamin D 06/10/2022 26.1 (L)  30.0 - 100.0 ng/ml Final    Hemoglobin A1C 06/10/2022 5.4  3.5 - 5.6 % Final    Hepatitis C Ab 06/10/2022 Non-Reactive  Non-Reactive Final    WBC 06/10/2022 5.78  3.40 - 10.80 10*3/mm3 Final    RBC 06/10/2022 5.08  3.77 - 5.28 10*6/mm3 Final    Hemoglobin 06/10/2022 14.5  12.0 - 15.9 g/dL Final    Hematocrit 06/10/2022 43.8  34.0 - 46.6 % Final    MCV 06/10/2022 86.2  79.0 - 97.0 fL Final    MCH 06/10/2022 28.5  26.6 - 33.0 pg Final    MCHC 06/10/2022 33.1  31.5 - 35.7 g/dL Final    RDW 06/10/2022 13.0  12.3 - 15.4 % Final    RDW-SD 06/10/2022 40.4  37.0 - 54.0 fl Final    MPV 06/10/2022 11.2  6.0 - 12.0 fL Final    Platelets 06/10/2022 240  140 - 450 10*3/mm3 Final    Neutrophil % 06/10/2022 46.4  42.7 - 76.0 % Final    Lymphocyte % 06/10/2022 41.2  19.6 - 45.3 % Final    Monocyte % 06/10/2022 8.7  5.0 - 12.0 % Final    Eosinophil % 06/10/2022 2.9  0.3 - 6.2 % Final    Basophil % 06/10/2022 0.5  0.0 - 1.5 % Final    Immature Grans % 06/10/2022 0.3  0.0 - 0.5 % Final    Neutrophils, Absolute 06/10/2022 2.68  1.70 - 7.00 10*3/mm3 Final    Lymphocytes, Absolute 06/10/2022 2.38  0.70 - 3.10 10*3/mm3 Final    Monocytes, Absolute 06/10/2022 0.50  0.10 - 0.90 10*3/mm3 Final    Eosinophils, Absolute  06/10/2022 0.17  0.00 - 0.40 10*3/mm3 Final    Basophils, Absolute 06/10/2022 0.03  0.00 - 0.20 10*3/mm3 Final    Immature Grans, Absolute 06/10/2022 0.02  0.00 - 0.05 10*3/mm3 Final    nRBC 06/10/2022 0.0  0.0 - 0.2 /100 WBC Final         Physical Exam   No acute distress  Pleasant, articulate, no distress  Integument warm, dry, pink without exanthema  Cerumen impaction bilateral ear  3+ edema of nasal mucosa with trace pallor and scant clear mucous  No sinus tenderness  No cervical lymph node enlargement  Cor regular rate and rhythm without murmur  Lungs clear to auscultation, excellent air movement throughout      Result Review  Data Reviewed:{ Labs  Result Review  Imaging  Med Tab  Media :23}                     Assessment and Plan {CC Problem List  Visit Diagnosis  ROS  Review (Popup)  Health Maintenance  Quality  BestPractice  Medications  SmartSets  SnapShot Encounters  Media :23}   Diagnoses and all orders for this visit:    1. Thunderclap headache (Primary)  -     CT Head Without Contrast; Future    2. Benign hypertension  -     metoprolol succinate XL (Toprol XL) 25 MG 24 hr tablet; Take 1 tablet by mouth Daily.  Dispense: 30 tablet; Refill: 0    3. Tobacco dependence due to cigarettes    4. Bilateral impacted cerumen    MDM:  No symptoms or findings consistent with viral, bacterial or allergen induced respiratory infection.  I am concerned that she state the headache occurred within a few seconds and not a slow build over minutes.  Also, due to the blurred vision, I am concerned about increase cerebral pressure onto the optic nerves.  CT head ordered for today.  Patient is to start a beta-blocker to lower her BP and affects of increased vascular pressure resulting in cephalgia.  Beta-blocker discussed and it's possible side effects.  Patient is advised to quit tobacco smoking, cold turkey, to do the ill effects on the vasculature in conjunction with acute severe cephalgia.  Patient  instructed to return to the ER, call 911 if unable to drive, if sudden headache returns.  Otherwise, Follow-up, in this office on Monday 1/6/25, with TOSHIA Ramirez.      Follow Up {Instructions Charge Capture  Follow-up Communications :23}     Patient was given instructions and counseling regarding her condition or for health maintenance advice. Please see specific information pulled into the AVS (placed there by myself) if appropriate.    Return in about 4 years (around 1/2/2029) for F/U BP, CT review.  Alicia Marin MD

## 2025-01-08 ENCOUNTER — TELEPHONE (OUTPATIENT)
Dept: FAMILY MEDICINE CLINIC | Facility: CLINIC | Age: 55
End: 2025-01-08
Payer: COMMERCIAL

## 2025-01-08 NOTE — TELEPHONE ENCOUNTER
HUB to relay description below.      LVM FOR PATIENT TO CALL OFFICE AND RS APPOINTMENT THAT WAS ON FOR 01-06-25. WITH VIRGIE NEXT AVAILABLE THANK YOU

## 2025-01-10 ENCOUNTER — TELEPHONE (OUTPATIENT)
Dept: FAMILY MEDICINE CLINIC | Facility: CLINIC | Age: 55
End: 2025-01-10
Payer: COMMERCIAL

## 2025-01-10 ENCOUNTER — CLINICAL SUPPORT (OUTPATIENT)
Dept: FAMILY MEDICINE CLINIC | Facility: CLINIC | Age: 55
End: 2025-01-10
Payer: COMMERCIAL

## 2025-01-10 VITALS
SYSTOLIC BLOOD PRESSURE: 164 MMHG | DIASTOLIC BLOOD PRESSURE: 104 MMHG | HEART RATE: 75 BPM | TEMPERATURE: 98.1 F | OXYGEN SATURATION: 94 %

## 2025-01-10 NOTE — TELEPHONE ENCOUNTER
PATIENT WAS SEEN IN OFFICE (NURSE VISIT) FOR BP CHECK. NP SALIMA LEON ADVISED PATIENT TO GO TO ER FOR FURTHER EVAL DUE TO BP READINGS. PATIENT DECLINED, AND STATES SHE DID NOT FEEL BAD AT THE MOMENT. PATIENT WAS ADVISED TO DOUBLE UP ON BP MEDICATION, AND MONITOR BP READINGS AT HOME BID AND KEEP A LOG OVER THE WEEKEND. PATIENT WAS INSTRUCTED TO CALL THE OFFICE ON MONDAY TO BE SQUEEZED IN WITH A PROVIDER IF SHE DID NOT GO TO THE ER OVER THE WEEKEND, AND BP WAS STILL HIGH OR SYMPTOMS HAVE WORSENED.

## 2025-01-10 NOTE — TELEPHONE ENCOUNTER
CALLED AND SPOKE WITH PT TO RESCHEDULE APPOINTMENT THAT WAS ON 01-06-25 (DUE TO WEATHER) FOR HER BP SHE STARTED [employer] ON 01-.  APPOINTMENT WAS OFFERED TO HER ON THE 13TH PT DENIED DUE TO WORK SCHEDULE. SHE IS SCHEDULE FOR 17TH PT WAS INFORMED TO A UC OR ER IF SYMPTOMS GETS WORSE PT VOICE UNDERSTOOD. PT DOES HAVE NURSE VISIT TODAY AT 3PM TO CHECK HER BP. PT WILL BE FORMED AGAIN IF SYMPTOMS GETS WORSE GO TO NEAREST ER OR UC PT HAS A SCHEDULE APPOINTMENT FOR A CT AT [facility] ON 01-23-25

## 2025-01-17 ENCOUNTER — OFFICE VISIT (OUTPATIENT)
Dept: FAMILY MEDICINE CLINIC | Facility: CLINIC | Age: 55
End: 2025-01-17
Payer: COMMERCIAL

## 2025-01-17 VITALS
DIASTOLIC BLOOD PRESSURE: 96 MMHG | RESPIRATION RATE: 18 BRPM | HEIGHT: 64 IN | OXYGEN SATURATION: 95 % | HEART RATE: 83 BPM | WEIGHT: 204.2 LBS | BODY MASS INDEX: 34.86 KG/M2 | TEMPERATURE: 98.2 F | SYSTOLIC BLOOD PRESSURE: 166 MMHG

## 2025-01-17 DIAGNOSIS — Z00.00 ENCOUNTER FOR MEDICAL EXAMINATION TO ESTABLISH CARE: Primary | ICD-10-CM

## 2025-01-17 DIAGNOSIS — Z13.31 SCREENING FOR DEPRESSION: ICD-10-CM

## 2025-01-17 DIAGNOSIS — E55.9 VITAMIN D DEFICIENCY: ICD-10-CM

## 2025-01-17 DIAGNOSIS — Z76.0 MEDICATION REFILL: ICD-10-CM

## 2025-01-17 DIAGNOSIS — F17.210 TOBACCO DEPENDENCE DUE TO CIGARETTES: ICD-10-CM

## 2025-01-17 DIAGNOSIS — I10 ELEVATED BLOOD PRESSURE READING WITH DIAGNOSIS OF HYPERTENSION: ICD-10-CM

## 2025-01-17 RX ORDER — HYDROCHLOROTHIAZIDE 12.5 MG/1
12.5 TABLET ORAL DAILY
Qty: 90 TABLET | Refills: 0 | Status: SHIPPED | OUTPATIENT
Start: 2025-01-17

## 2025-01-17 RX ORDER — METOPROLOL SUCCINATE 25 MG/1
25 TABLET, EXTENDED RELEASE ORAL 2 TIMES DAILY
Qty: 180 TABLET | Refills: 0 | Status: SHIPPED | OUTPATIENT
Start: 2025-01-17

## 2025-01-20 DIAGNOSIS — I10 ELEVATED BLOOD PRESSURE READING WITH DIAGNOSIS OF HYPERTENSION: ICD-10-CM

## 2025-01-20 DIAGNOSIS — Z76.0 MEDICATION REFILL: ICD-10-CM

## 2025-01-20 RX ORDER — METOPROLOL SUCCINATE 25 MG/1
25 TABLET, EXTENDED RELEASE ORAL 2 TIMES DAILY
Qty: 180 TABLET | Refills: 0 | OUTPATIENT
Start: 2025-01-20

## 2025-01-20 NOTE — TELEPHONE ENCOUNTER
Caller: Karla Farias    Relationship: Self    Best call back number: [phone]     Requested Prescriptions:   Requested Prescriptions     Pending Prescriptions Disp Refills    metoprolol succinate XL (Toprol XL) 25 MG 24 hr tablet 180 tablet 0     Sig: Take 1 tablet by mouth 2 (Two) Times a Day.        Pharmacy where request should be sent: [address] [phone] [facility] [phone] [facility]     Last office visit with prescribing clinician: 1/17/2025   Last telemedicine visit with prescribing clinician: Visit date not found   Next office visit with prescribing clinician: 2/3/2025     Additional details provided by patient: PHARMACY IS NOT ABLE TO FILL THIS BECAUSE INSURANCE STATED THAT IT IS TO SOON, BUT THAT IS BECAUSE SHE WAS TAKING 1 PILL DAILY AND NOW IS TAKING 2 PILLS DAILY. CAN SOMEONE REACH OUT TO INSURANCE COMPANY SO SHE CAN GET THIS MEDICATION SHE IS OUT.     Does the patient have less than a 3 day supply:  [x] Yes  [] No    Would you like a call back once the refill request has been completed: [x] Yes [] No    If the office needs to give you a call back, can they leave a voicemail: [] Yes [] No    Kayla Wei Rep   01/20/25 09:31 EST

## 2025-01-20 NOTE — PROGRESS NOTES
Karla Farias  [id number]  1970  female     01/17/2025      Chief Complaint  Establish Care and Hypertension (Follow up. Brought home BP readings in. )    History of Present Illness  54-year-old female patient presents today to establish care.  Screened for depression, denies feeling down.  History of hypertension and tobacco dependence due to cigarettes.  Discussed associated risk of cigarette smoking, patient verbalized understanding and verbalizes need of smoking cessation.  Patient was seen by Dr. Marin on January 2, 2025 and was started on beta-blocker.  I reviewed that office note which patient was diagnosed with thunderclap headache and concerns for increased cerebral pressure therefore patient had head CT ordered.  Patient has yet to obtain head CT which is scheduled for January 23, 2025 at [facility].  Patient was scheduled to be seen on January 6, 2025 but was not seen at that time due to our office being closed due to inclement weather.  Patient was here for nurse visit on January 10 which noted elevated blood pressure reading and was advised by other provider in office Kayode Evans to go to nearest hospital ER for workup.  Patient states she did not go to hospital ER.  Patient states provider on nurse visit on January 10 advised her to start taking 2 of her 25 mg metoprolol.  States she used to take hydrochlorothiazide and tolerated it well.    Patient denies fever, chills, body aches, headache, lightheadedness, dizziness, numbness, tingling, blurry vision, cough, shortness of breath, chest pain, palpitations, leg swelling, abdominal pain, NVD, dysuria, rash.  Hypertension                Review of Systems   Constitutional: Negative.    HENT: Negative.     Eyes: Negative.    Respiratory: Negative.     Cardiovascular: Negative.    Gastrointestinal: Negative.    Endocrine: Negative.    Genitourinary: Negative.    Musculoskeletal: Negative.    Skin: Negative.    Neurological: Negative.   "  Hematological: Negative.    Psychiatric/Behavioral: Negative.         Past Medical History:   Diagnosis Date    Arthritis     Deep vein thrombosis 2005    Hyperlipidemia     Hypertension        Past Surgical History:   Procedure Laterality Date     SECTION      x2    ENDOMETRIAL ABLATION      LASER ABLATION OF THE CERVIX  2019       Family History   Problem Relation Age of Onset    Brain cancer Mother     Cancer Mother     Lung cancer Father     Hypertension Father     Heart failure Father     Emphysema Father     Asthma Father     Cancer Father     COPD Father     Diabetes Sister     Hypertension Maternal Grandmother     Hyperlipidemia Maternal Grandmother     Stroke Maternal Grandmother     Alzheimer's disease Paternal Grandmother     Heart failure Paternal Grandfather        Social History     Socioeconomic History    Marital status:    Tobacco Use    Smoking status: Every Day     Current packs/day: 1.00     Average packs/day: 1 pack/day for 35.0 years (35.0 ttl pk-yrs)     Types: Cigarettes     Start date: 1990    Smokeless tobacco: Never   Vaping Use    Vaping status: Never Used   Substance and Sexual Activity    Alcohol use: Not Currently     Alcohol/week: 1.0 standard drink of alcohol     Types: 1 Cans of beer per week    Drug use: Never    Sexual activity: Yes     Partners: Male     Birth control/protection: None        No Known Allergies      Objective   Vital Signs:   /96 (BP Location: Left arm, Patient Position: Sitting, Cuff Size: Adult)   Pulse 83   Temp 98.2 °F (36.8 °C) (Temporal)   Resp 18   Ht 162.6 cm (64.02\")   Wt 92.6 kg (204 lb 3.2 oz)   SpO2 95%   BMI 35.03 kg/m²       Physical Exam  Vitals and nursing note reviewed.   Constitutional:       General: She is not in acute distress.     Appearance: Normal appearance. She is not ill-appearing, toxic-appearing or diaphoretic.   HENT:      Head: Normocephalic and atraumatic.      Jaw: There is normal jaw occlusion. "      Right Ear: Hearing and external ear normal.      Left Ear: Hearing and external ear normal.      Nose: Nose normal.      Mouth/Throat:      Lips: Pink.   Eyes:      General: Lids are normal. Vision grossly intact. Gaze aligned appropriately.      Extraocular Movements: Extraocular movements intact.      Conjunctiva/sclera: Conjunctivae normal.      Pupils: Pupils are equal, round, and reactive to light.   Cardiovascular:      Rate and Rhythm: Normal rate and regular rhythm.      Pulses: Normal pulses.           Carotid pulses are 2+ on the right side and 2+ on the left side.       Radial pulses are 2+ on the right side and 2+ on the left side.        Dorsalis pedis pulses are 2+ on the right side and 2+ on the left side.        Posterior tibial pulses are 2+ on the right side and 2+ on the left side.      Heart sounds: Normal heart sounds, S1 normal and S2 normal. No murmur heard.  Pulmonary:      Effort: Pulmonary effort is normal.      Breath sounds: Normal breath sounds and air entry.   Abdominal:      General: Abdomen is flat. Bowel sounds are normal. There is no distension or abdominal bruit.      Palpations: Abdomen is soft.      Tenderness: There is no abdominal tenderness.   Musculoskeletal:         General: Normal range of motion.      Cervical back: Full passive range of motion without pain, normal range of motion and neck supple.      Right lower leg: No edema.      Left lower leg: No edema.   Skin:     General: Skin is warm and dry.      Capillary Refill: Capillary refill takes less than 2 seconds.      Coloration: Skin is not cyanotic or pale.      Findings: No bruising, erythema or rash.   Neurological:      General: No focal deficit present.      Mental Status: She is alert and oriented to person, place, and time. Mental status is at baseline.      GCS: GCS eye subscore is 4. GCS verbal subscore is 5. GCS motor subscore is 6.      Cranial Nerves: Cranial nerves 2-12 are intact. No cranial nerve  deficit.      Sensory: Sensation is intact. No sensory deficit.      Motor: Motor function is intact. No weakness.      Coordination: Coordination is intact. Coordination normal.      Gait: Gait is intact. Gait normal.      Deep Tendon Reflexes: Reflexes normal.   Psychiatric:         Attention and Perception: Attention and perception normal.         Mood and Affect: Mood and affect normal.         Speech: Speech normal.         Behavior: Behavior normal. Behavior is cooperative.         Thought Content: Thought content normal.         Cognition and Memory: Cognition and memory normal.         Judgment: Judgment normal.                 Assessment and Plan   Diagnoses and all orders for this visit:    1. Encounter for medical examination to establish care (Primary)  -     CBC w AUTO Differential; Future  -     Comprehensive metabolic panel; Future  -     TSH Rfx On Abnormal To Free T4; Future  -     Hemoglobin A1c; Future  -     Lipid panel; Future  -     Vitamin D,25-Hydroxy; Future    2. Elevated blood pressure reading with diagnosis of hypertension  -     hydroCHLOROthiazide 12.5 MG tablet; Take 1 tablet by mouth Daily.  Dispense: 90 tablet; Refill: 0  -     metoprolol succinate XL (Toprol XL) 25 MG 24 hr tablet; Take 1 tablet by mouth 2 (Two) Times a Day.  Dispense: 180 tablet; Refill: 0  -     CBC w AUTO Differential; Future  -     Comprehensive metabolic panel; Future  -     TSH Rfx On Abnormal To Free T4; Future  -     Hemoglobin A1c; Future  -     Lipid panel; Future  -     Vitamin D,25-Hydroxy; Future    3. Medication refill  -     hydroCHLOROthiazide 12.5 MG tablet; Take 1 tablet by mouth Daily.  Dispense: 90 tablet; Refill: 0  -     metoprolol succinate XL (Toprol XL) 25 MG 24 hr tablet; Take 1 tablet by mouth 2 (Two) Times a Day.  Dispense: 180 tablet; Refill: 0    4. Tobacco dependence due to cigarettes  -     CBC w AUTO Differential; Future  -     Comprehensive metabolic panel; Future  -     TSH Rfx On  Abnormal To Free T4; Future  -     Hemoglobin A1c; Future  -     Lipid panel; Future    5. Screening for depression    6. Vitamin D deficiency  -     Vitamin D,25-Hydroxy; Future      Establish care  -Screened for depression, denies feeling down.    Elevated blood-pressure reading with diagnosis of hypertension  -Blood pressure elevated but stable.  Patient asymptomatic.  -Tolerating metoprolol well.  Patient to continue 25 mg metoprolol twice daily.  Refill sent to pharmacy on file.  -Adding hydrochlorothiazide 12.5 mg daily.  Sent to pharmacy on file.  -Patient has head CT scheduled at [facility] in January 23, 2025.  -Instructed patient to monitor blood pressure at home daily and call my office if her blood pressures greater than 150/90.  -Will obtain fasting labs next week.  -Advised patient to stop cigarette smoking.    Tobacco dependence due to cigarettes  -Discussed associated risk such as lung disease, heart disease, cancer, etc.  -Advised patient to stop cigarette smoking.    -Discussed ER red flags.  -Pending fasting labs next week.  -Instructed patient to follow-up with me in 2 weeks for hypertension.    Follow Up   Return in about 2 weeks (around 1/31/2025) for Recheck.    There are no Patient Instructions on file for this visit.   Answers submitted by the patient for this visit:  Primary Reason for Visit (Submitted on 1/17/2025)  What is the primary reason for your visit?: High Blood Pressure

## 2025-01-21 ENCOUNTER — CLINICAL SUPPORT (OUTPATIENT)
Dept: FAMILY MEDICINE CLINIC | Facility: CLINIC | Age: 55
End: 2025-01-21
Payer: COMMERCIAL

## 2025-01-21 DIAGNOSIS — E55.9 VITAMIN D DEFICIENCY: ICD-10-CM

## 2025-01-21 DIAGNOSIS — F17.210 TOBACCO DEPENDENCE DUE TO CIGARETTES: ICD-10-CM

## 2025-01-21 DIAGNOSIS — Z00.00 ENCOUNTER FOR MEDICAL EXAMINATION TO ESTABLISH CARE: ICD-10-CM

## 2025-01-21 DIAGNOSIS — I10 ELEVATED BLOOD PRESSURE READING WITH DIAGNOSIS OF HYPERTENSION: ICD-10-CM

## 2025-01-21 LAB — TSH SERPL DL<=0.05 MIU/L-ACNC: 1.81 UIU/ML (ref 0.27–4.2)

## 2025-01-21 PROCEDURE — 83036 HEMOGLOBIN GLYCOSYLATED A1C: CPT | Performed by: NURSE PRACTITIONER

## 2025-01-21 PROCEDURE — 80050 GENERAL HEALTH PANEL: CPT | Performed by: NURSE PRACTITIONER

## 2025-01-21 PROCEDURE — 80061 LIPID PANEL: CPT | Performed by: NURSE PRACTITIONER

## 2025-01-21 PROCEDURE — 82306 VITAMIN D 25 HYDROXY: CPT | Performed by: NURSE PRACTITIONER

## 2025-01-22 LAB
25(OH)D3 SERPL-MCNC: 20.3 NG/ML (ref 30–100)
ALBUMIN SERPL-MCNC: 4.3 G/DL (ref 3.5–5.2)
ALBUMIN/GLOB SERPL: 1.5 G/DL
ALP SERPL-CCNC: 57 U/L (ref 39–117)
ALT SERPL W P-5'-P-CCNC: 10 U/L (ref 1–33)
ANION GAP SERPL CALCULATED.3IONS-SCNC: 11 MMOL/L (ref 5–15)
AST SERPL-CCNC: 19 U/L (ref 1–32)
BASOPHILS # BLD AUTO: 0.02 10*3/MM3 (ref 0–0.2)
BASOPHILS NFR BLD AUTO: 0.3 % (ref 0–1.5)
BILIRUB SERPL-MCNC: 0.4 MG/DL (ref 0–1.2)
BUN SERPL-MCNC: 11 MG/DL (ref 6–20)
BUN/CREAT SERPL: 15.3 (ref 7–25)
CALCIUM SPEC-SCNC: 9.2 MG/DL (ref 8.6–10.5)
CHLORIDE SERPL-SCNC: 95 MMOL/L (ref 98–107)
CHOLEST SERPL-MCNC: 256 MG/DL (ref 0–200)
CO2 SERPL-SCNC: 28 MMOL/L (ref 22–29)
CREAT SERPL-MCNC: 0.72 MG/DL (ref 0.57–1)
DEPRECATED RDW RBC AUTO: 39.5 FL (ref 37–54)
EGFRCR SERPLBLD CKD-EPI 2021: 100.7 ML/MIN/1.73
EOSINOPHIL # BLD AUTO: 0.08 10*3/MM3 (ref 0–0.4)
EOSINOPHIL NFR BLD AUTO: 1.1 % (ref 0.3–6.2)
ERYTHROCYTE [DISTWIDTH] IN BLOOD BY AUTOMATED COUNT: 12.9 % (ref 12.3–15.4)
GLOBULIN UR ELPH-MCNC: 2.9 GM/DL
GLUCOSE SERPL-MCNC: 70 MG/DL (ref 65–99)
HBA1C MFR BLD: 5.6 % (ref 4.8–5.6)
HCT VFR BLD AUTO: 47.4 % (ref 34–46.6)
HDLC SERPL-MCNC: 56 MG/DL (ref 40–60)
HGB BLD-MCNC: 16 G/DL (ref 12–15.9)
IMM GRANULOCYTES # BLD AUTO: 0.03 10*3/MM3 (ref 0–0.05)
IMM GRANULOCYTES NFR BLD AUTO: 0.4 % (ref 0–0.5)
LDLC SERPL CALC-MCNC: 179 MG/DL (ref 0–100)
LDLC/HDLC SERPL: 3.15 {RATIO}
LYMPHOCYTES # BLD AUTO: 2.3 10*3/MM3 (ref 0.7–3.1)
LYMPHOCYTES NFR BLD AUTO: 30.9 % (ref 19.6–45.3)
MCH RBC QN AUTO: 28.8 PG (ref 26.6–33)
MCHC RBC AUTO-ENTMCNC: 33.8 G/DL (ref 31.5–35.7)
MCV RBC AUTO: 85.4 FL (ref 79–97)
MONOCYTES # BLD AUTO: 0.53 10*3/MM3 (ref 0.1–0.9)
MONOCYTES NFR BLD AUTO: 7.1 % (ref 5–12)
NEUTROPHILS NFR BLD AUTO: 4.49 10*3/MM3 (ref 1.7–7)
NEUTROPHILS NFR BLD AUTO: 60.2 % (ref 42.7–76)
NRBC BLD AUTO-RTO: 0 /100 WBC (ref 0–0.2)
PLATELET # BLD AUTO: 229 10*3/MM3 (ref 140–450)
PMV BLD AUTO: 11.5 FL (ref 6–12)
POTASSIUM SERPL-SCNC: 4.1 MMOL/L (ref 3.5–5.2)
PROT SERPL-MCNC: 7.2 G/DL (ref 6–8.5)
RBC # BLD AUTO: 5.55 10*6/MM3 (ref 3.77–5.28)
SODIUM SERPL-SCNC: 134 MMOL/L (ref 136–145)
TRIGL SERPL-MCNC: 119 MG/DL (ref 0–150)
VLDLC SERPL-MCNC: 21 MG/DL (ref 5–40)
WBC NRBC COR # BLD AUTO: 7.45 10*3/MM3 (ref 3.4–10.8)

## 2025-01-23 ENCOUNTER — HOSPITAL ENCOUNTER (OUTPATIENT)
Dept: CT IMAGING | Facility: HOSPITAL | Age: 55
Discharge: HOME | End: 2025-01-23
Admitting: FAMILY MEDICINE
Payer: COMMERCIAL

## 2025-01-23 DIAGNOSIS — G44.53 THUNDERCLAP HEADACHE: ICD-10-CM

## 2025-01-23 PROCEDURE — 70450 CT HEAD/BRAIN W/O DYE: CPT

## 2025-01-23 RX ORDER — ERGOCALCIFEROL 1.25 MG/1
50000 CAPSULE, LIQUID FILLED ORAL WEEKLY
Qty: 5 CAPSULE | Refills: 1 | Status: SHIPPED | OUTPATIENT
Start: 2025-01-23

## 2025-02-03 ENCOUNTER — OFFICE VISIT (OUTPATIENT)
Dept: FAMILY MEDICINE CLINIC | Facility: CLINIC | Age: 55
End: 2025-02-03
Payer: COMMERCIAL

## 2025-02-03 VITALS
BODY MASS INDEX: 33.77 KG/M2 | SYSTOLIC BLOOD PRESSURE: 152 MMHG | WEIGHT: 197.8 LBS | TEMPERATURE: 98.7 F | DIASTOLIC BLOOD PRESSURE: 95 MMHG | HEIGHT: 64 IN | RESPIRATION RATE: 16 BRPM | HEART RATE: 86 BPM | OXYGEN SATURATION: 97 %

## 2025-02-03 DIAGNOSIS — F17.210 TOBACCO DEPENDENCE DUE TO CIGARETTES: ICD-10-CM

## 2025-02-03 DIAGNOSIS — Z76.0 MEDICATION REFILL: ICD-10-CM

## 2025-02-03 DIAGNOSIS — Z12.2 ENCOUNTER FOR SCREENING FOR LUNG CANCER: ICD-10-CM

## 2025-02-03 DIAGNOSIS — H00.011 HORDEOLUM EXTERNUM OF RIGHT UPPER EYELID: ICD-10-CM

## 2025-02-03 DIAGNOSIS — Z12.31 SCREENING MAMMOGRAM FOR BREAST CANCER: ICD-10-CM

## 2025-02-03 DIAGNOSIS — I10 ELEVATED BLOOD PRESSURE READING WITH DIAGNOSIS OF HYPERTENSION: Primary | ICD-10-CM

## 2025-02-03 PROCEDURE — 99214 OFFICE O/P EST MOD 30 MIN: CPT | Performed by: NURSE PRACTITIONER

## 2025-02-03 RX ORDER — CEPHALEXIN 500 MG/1
500 CAPSULE ORAL 3 TIMES DAILY
Qty: 30 CAPSULE | Refills: 0 | Status: SHIPPED | OUTPATIENT
Start: 2025-02-03 | End: 2025-02-13

## 2025-02-03 RX ORDER — METOPROLOL SUCCINATE 25 MG/1
25 TABLET, EXTENDED RELEASE ORAL 2 TIMES DAILY
Qty: 180 TABLET | Refills: 0 | Status: SHIPPED | OUTPATIENT
Start: 2025-02-03

## 2025-02-03 RX ORDER — TOBRAMYCIN AND DEXAMETHASONE 3; 1 MG/ML; MG/ML
1 SUSPENSION/ DROPS OPHTHALMIC
Qty: 2 ML | Refills: 0 | Status: SHIPPED | OUTPATIENT
Start: 2025-02-03 | End: 2025-02-10

## 2025-02-03 NOTE — PROGRESS NOTES
Karla Farias  [id number]  1970  female     02/03/2025      Chief Complaint  Hypertension (Follow up) and Facial Swelling (Right eye x 1 day. Patient feels like there is some discharge is present/)    History of Present Illness  54-year-old female patient presents today to follow-up on hypertension.  Blood pressure mildly elevated today.  Patient reports she has been off of her metoprolol for 1 week due to being out of her medication.  Requesting refill metoprolol.  Tolerating hydrochlorothiazide well.  Patient agrees to set up a low-dose chest CT for lung cancer screening.  Patient also due for screening mammogram.  Requesting lung cancer screening and mammogram be ordered at [facility].  Patient agrees to set up routine Pap exam and states she will set this up with Allison in our office.  Reports it has been years since her last Pap exam with her previous PCP Dr. Marin.  Reviewed most recent labs from January 21, 2025 which were stable.  Patient reports she just had her head CT performed 1 to 2 weeks ago.  Patient complaining of stye to right upper eyelid x 1 day.  History of stye.  No known drug allergies.  Denies foreign body.  Denies blurry vision.  Denies headache, lightheadedness, dizziness, numbness, tingling, cough, shortness of breath, chest pain, abdominal pain, NVD, dysuria, rash.  Hypertension    Facial Swelling              Review of Systems   Constitutional: Negative.    HENT: Negative.     Eyes:  Negative for photophobia, pain, discharge, redness, itching and visual disturbance.        Stye right upper eyelid   Respiratory: Negative.     Cardiovascular: Negative.    Gastrointestinal: Negative.    Endocrine: Negative.    Genitourinary: Negative.    Musculoskeletal: Negative.    Skin: Negative.    Neurological: Negative.    Hematological: Negative.    Psychiatric/Behavioral: Negative.         Past Medical History:   Diagnosis Date    Arthritis     Deep vein thrombosis 2005     "Hyperlipidemia     Hypertension        Past Surgical History:   Procedure Laterality Date     SECTION      x2    ENDOMETRIAL ABLATION      LASER ABLATION OF THE CERVIX  2019       Family History   Problem Relation Age of Onset    Brain cancer Mother     Cancer Mother     Lung cancer Father     Hypertension Father     Heart failure Father     Emphysema Father     Asthma Father     Cancer Father     COPD Father     Diabetes Sister     Hypertension Maternal Grandmother     Hyperlipidemia Maternal Grandmother     Stroke Maternal Grandmother     Alzheimer's disease Paternal Grandmother     Heart failure Paternal Grandfather        Social History     Socioeconomic History    Marital status:    Tobacco Use    Smoking status: Every Day     Current packs/day: 1.00     Average packs/day: 1 pack/day for 35.1 years (35.1 ttl pk-yrs)     Types: Cigarettes     Start date: 1990    Smokeless tobacco: Never   Vaping Use    Vaping status: Never Used   Substance and Sexual Activity    Alcohol use: Not Currently     Alcohol/week: 1.0 standard drink of alcohol     Types: 1 Cans of beer per week    Drug use: Never    Sexual activity: Yes     Partners: Male     Birth control/protection: None        No Known Allergies      Objective   Vital Signs:   /95 (BP Location: Left arm, Patient Position: Sitting, Cuff Size: Adult)   Pulse 86   Temp 98.7 °F (37.1 °C) (Temporal)   Resp 16   Ht 162.6 cm (64.02\")   Wt 89.7 kg (197 lb 12.8 oz)   SpO2 97%   BMI 33.93 kg/m²       Physical Exam  Vitals and nursing note reviewed.   Constitutional:       General: She is not in acute distress.     Appearance: Normal appearance. She is not ill-appearing, toxic-appearing or diaphoretic.   HENT:      Head: Normocephalic and atraumatic.      Jaw: There is normal jaw occlusion.      Right Ear: Hearing and external ear normal.      Left Ear: Hearing and external ear normal.      Nose: Nose normal.      Mouth/Throat:      Lips: " Pink.   Eyes:      General: Lids are normal. Vision grossly intact. Gaze aligned appropriately.         Right eye: Hordeolum (upper eyelid) present. No foreign body or discharge.      Extraocular Movements: Extraocular movements intact.      Conjunctiva/sclera: Conjunctivae normal.      Pupils: Pupils are equal, round, and reactive to light.   Cardiovascular:      Rate and Rhythm: Normal rate and regular rhythm.      Pulses: Normal pulses.           Carotid pulses are 2+ on the right side and 2+ on the left side.       Radial pulses are 2+ on the right side and 2+ on the left side.        Dorsalis pedis pulses are 2+ on the right side and 2+ on the left side.        Posterior tibial pulses are 2+ on the right side and 2+ on the left side.      Heart sounds: Normal heart sounds, S1 normal and S2 normal. No murmur heard.  Pulmonary:      Effort: Pulmonary effort is normal. No tachypnea or respiratory distress.      Breath sounds: Normal breath sounds and air entry. No decreased breath sounds or wheezing.   Abdominal:      General: Abdomen is flat. Bowel sounds are normal. There is no distension or abdominal bruit.      Palpations: Abdomen is soft.      Tenderness: There is no abdominal tenderness.   Musculoskeletal:         General: Normal range of motion.      Cervical back: Full passive range of motion without pain, normal range of motion and neck supple.      Right lower leg: No edema.      Left lower leg: No edema.   Skin:     General: Skin is warm and dry.      Capillary Refill: Capillary refill takes less than 2 seconds.      Coloration: Skin is not cyanotic or pale.      Findings: No bruising, erythema or rash.   Neurological:      General: No focal deficit present.      Mental Status: She is alert and oriented to person, place, and time. Mental status is at baseline.      GCS: GCS eye subscore is 4. GCS verbal subscore is 5. GCS motor subscore is 6.      Cranial Nerves: Cranial nerves 2-12 are intact. No  cranial nerve deficit.      Sensory: Sensation is intact. No sensory deficit.      Motor: Motor function is intact. No weakness.      Coordination: Coordination is intact. Coordination normal.      Gait: Gait is intact. Gait normal.      Deep Tendon Reflexes: Reflexes normal.   Psychiatric:         Attention and Perception: Attention and perception normal.         Mood and Affect: Mood and affect normal.         Speech: Speech normal.         Behavior: Behavior normal. Behavior is cooperative.         Thought Content: Thought content normal.         Cognition and Memory: Cognition and memory normal.         Judgment: Judgment normal.                 Assessment and Plan   Diagnoses and all orders for this visit:    1. Elevated blood pressure reading with diagnosis of hypertension (Primary)  -     metoprolol succinate XL (Toprol XL) 25 MG 24 hr tablet; Take 1 tablet by mouth 2 (Two) Times a Day.  Dispense: 180 tablet; Refill: 0    2. Hordeolum externum of right upper eyelid  -     cephalexin (KEFLEX) 500 MG capsule; Take 1 capsule by mouth 3 (Three) Times a Day for 10 days.  Dispense: 30 capsule; Refill: 0  -     tobramycin-dexAMETHasone (TobraDex) 0.3-0.1 % ophthalmic suspension; Administer 1 drop to the right eye Every 4 (Four) Hours While Awake for 7 days.  Dispense: 2 mL; Refill: 0    3. Medication refill  -     metoprolol succinate XL (Toprol XL) 25 MG 24 hr tablet; Take 1 tablet by mouth 2 (Two) Times a Day.  Dispense: 180 tablet; Refill: 0    4. Tobacco dependence due to cigarettes  -     CT Chest Low Dose Wo; Future    5. Encounter for screening for lung cancer  -     CT Chest Low Dose Wo; Future    6. Screening mammogram for breast cancer  -     Mammo Screening Digital Tomosynthesis Bilateral With CAD; Future    Elevated blood-pressure reading with diagnosis of hypertension  -BP mildly elevated.  BP mild improvement.  Asymptomatic today.  -Patient reports she has been out of her metoprolol for 1 week.   Refilled metoprolol today and advised patient to start taking as prescribed.  -Patient to continue current BP meds which include metoprolol and hydrochlorothiazide.  -Instructed patient to monitor blood pressure at home daily and call my office for blood pressures greater than 150/90.  -Reviewed most recent labs from January 21, 2025 which were stable.  -Reviewed head CT that was performed on January 23, 2025.  Findings:  There is no evidence of hemorrhage. There is no mass effect or midline shift.     There is no extracerebral collection.     Ventricles are normal in size and configuration for patient's stated age.      Posterior fossa is within normal limits.     Calvarium and skull base appear intact.  Visualized sinuses show no air fluid levels. Visualized orbits are unremarkable.        IMPRESSION:  Impression:  No acute intracranial abnormality identified.    Hordeolum externum of right upper eyelid  -History of.  -Treating with Keflex antibiotic and TobraDex.  -Instructed to apply warm compresses to affected area.    Tobacco dependence due to cigarettes  -Have discussed associated risk such as lung disease, heart disease, cancer, etc.  -Advised patient to stop cigarette smoking.  -Ordered low-dose chest CT for lung cancer screening at [facility], [place] per patient request.    -Ordered screening mammogram at [facility], [place] per patient request.    -Patient advised to set up routine Pap exam.  Patient reports she will set up Pap exam with Allison in our office.    -Discussed ER red flags.  -Instructed patient to follow-up with me in 6 weeks for annual physical exam.    Follow Up   Return in about 6 weeks (around 3/17/2025) for Annual physical.    There are no Patient Instructions on file for this visit.

## 2025-02-16 LAB
NCCN CRITERIA FLAG: NORMAL
TYRER CUZICK SCORE: 8

## 2025-02-18 ENCOUNTER — HOSPITAL ENCOUNTER (OUTPATIENT)
Dept: MAMMOGRAPHY | Facility: HOSPITAL | Age: 55
Discharge: HOME | End: 2025-02-18
Payer: COMMERCIAL

## 2025-02-18 ENCOUNTER — HOSPITAL ENCOUNTER (OUTPATIENT)
Dept: CT IMAGING | Facility: HOSPITAL | Age: 55
Discharge: HOME | End: 2025-02-18
Payer: COMMERCIAL

## 2025-02-18 DIAGNOSIS — Z12.2 ENCOUNTER FOR SCREENING FOR LUNG CANCER: ICD-10-CM

## 2025-02-18 DIAGNOSIS — F17.210 TOBACCO DEPENDENCE DUE TO CIGARETTES: ICD-10-CM

## 2025-02-18 DIAGNOSIS — Z12.31 SCREENING MAMMOGRAM FOR BREAST CANCER: ICD-10-CM

## 2025-02-18 PROCEDURE — 71271 CT THORAX LUNG CANCER SCR C-: CPT

## 2025-02-18 PROCEDURE — 77067 SCR MAMMO BI INCL CAD: CPT

## 2025-02-18 PROCEDURE — 77063 BREAST TOMOSYNTHESIS BI: CPT

## 2025-03-20 ENCOUNTER — OFFICE VISIT (OUTPATIENT)
Dept: FAMILY MEDICINE CLINIC | Facility: CLINIC | Age: 55
End: 2025-03-20
Payer: COMMERCIAL

## 2025-03-20 VITALS
BODY MASS INDEX: 34.15 KG/M2 | HEIGHT: 64 IN | OXYGEN SATURATION: 97 % | HEART RATE: 78 BPM | WEIGHT: 200 LBS | DIASTOLIC BLOOD PRESSURE: 96 MMHG | TEMPERATURE: 97.9 F | RESPIRATION RATE: 18 BRPM | SYSTOLIC BLOOD PRESSURE: 136 MMHG

## 2025-03-20 DIAGNOSIS — F17.210 TOBACCO DEPENDENCE DUE TO CIGARETTES: ICD-10-CM

## 2025-03-20 DIAGNOSIS — M62.830 SPASM OF RIGHT TRAPEZIUS MUSCLE: ICD-10-CM

## 2025-03-20 DIAGNOSIS — Z13.31 SCREENING FOR DEPRESSION: ICD-10-CM

## 2025-03-20 DIAGNOSIS — I10 ELEVATED BLOOD PRESSURE READING WITH DIAGNOSIS OF HYPERTENSION: ICD-10-CM

## 2025-03-20 DIAGNOSIS — Z00.00 WELL ADULT EXAM: Primary | ICD-10-CM

## 2025-03-20 DIAGNOSIS — M25.511 ACUTE PAIN OF RIGHT SHOULDER: ICD-10-CM

## 2025-03-20 DIAGNOSIS — Z12.11 SCREENING FOR COLON CANCER: ICD-10-CM

## 2025-03-20 RX ORDER — MELOXICAM 7.5 MG/1
7.5 TABLET ORAL DAILY
Qty: 7 TABLET | Refills: 0 | Status: SHIPPED | OUTPATIENT
Start: 2025-03-20 | End: 2025-03-27

## 2025-03-20 RX ORDER — LISINOPRIL 2.5 MG/1
2.5 TABLET ORAL DAILY
Qty: 90 TABLET | Refills: 0 | Status: SHIPPED | OUTPATIENT
Start: 2025-03-20

## 2025-03-20 RX ORDER — CYCLOBENZAPRINE HCL 5 MG
5 TABLET ORAL 2 TIMES DAILY PRN
Qty: 20 TABLET | Refills: 0 | Status: SHIPPED | OUTPATIENT
Start: 2025-03-20

## 2025-03-27 DIAGNOSIS — E55.9 VITAMIN D DEFICIENCY: ICD-10-CM

## 2025-03-27 RX ORDER — ERGOCALCIFEROL 1.25 MG/1
50000 CAPSULE, LIQUID FILLED ORAL WEEKLY
Qty: 5 CAPSULE | Refills: 0 | Status: SHIPPED | OUTPATIENT
Start: 2025-03-27

## 2025-04-03 NOTE — PROGRESS NOTES
Karla Farias  [id number]  1970  female     2025      Chief Complaint  Annual Exam and Shoulder Pain (Right shoulder pain.  Tylenol/arthritis is not helping any longer. Neck is feeling stiff.  Started about a month ago.  States it feels more in the muscle and radiates down the arm.  )    History of Present Illness  54-year-old female patient presents today for annual well adult exam.  Screened for depression, denies feeling down.  Labs up-to-date.  Lung cancer screening on 2025 benign.  Screening mammogram of 2025 negative.  Patient agrees on placing referral for screening colonoscopy at this time.  Recommended routine Pap exam.  Patient states she will set up Pap exam with Allison in our office.      Blood pressure mildly elevated today but stable.  Denies fever, headache, lightheadedness, dizziness, numbness, tingling, cough, shortness of breath, chest pain, palpitations, leg swelling, abdominal pain, NVD, dysuria, rash.  Patient does complain of right shoulder pain.  Denies recent trauma or injury.  Denies neck pain or back pain.              Review of Systems   Musculoskeletal:  Positive for arthralgias (Right shoulder). Negative for back pain, gait problem, joint swelling, myalgias, neck pain and neck stiffness.       Past Medical History:   Diagnosis Date    Arthritis     Deep vein thrombosis     Hyperlipidemia     Hypertension        Past Surgical History:   Procedure Laterality Date     SECTION      x2    ENDOMETRIAL ABLATION      LASER ABLATION OF THE CERVIX  2019       Family History   Problem Relation Age of Onset    Brain cancer Mother     Cancer Mother     Lung cancer Father     Hypertension Father     Heart failure Father     Emphysema Father     Asthma Father     Cancer Father     COPD Father     Diabetes Sister     Hypertension Maternal Grandmother     Hyperlipidemia Maternal Grandmother     Stroke Maternal Grandmother     Alzheimer's disease  "Paternal Grandmother     Heart failure Paternal Grandfather        Social History     Socioeconomic History    Marital status:    Tobacco Use    Smoking status: Every Day     Current packs/day: 1.00     Average packs/day: 1 pack/day for 35.3 years (35.3 ttl pk-yrs)     Types: Cigarettes     Start date: 1/1/1990    Smokeless tobacco: Never   Vaping Use    Vaping status: Never Used   Substance and Sexual Activity    Alcohol use: Not Currently     Alcohol/week: 1.0 standard drink of alcohol     Types: 1 Cans of beer per week    Drug use: Never    Sexual activity: Yes     Partners: Male     Birth control/protection: None        No Known Allergies      Objective   Vital Signs:   /96 (BP Location: Left arm, Patient Position: Sitting, Cuff Size: Adult)   Pulse 78   Temp 97.9 °F (36.6 °C) (Temporal)   Resp 18   Ht 162.6 cm (64.02\")   Wt 90.7 kg (200 lb)   SpO2 97%   BMI 34.31 kg/m²       Physical Exam  Vitals and nursing note reviewed.   Constitutional:       General: She is not in acute distress.     Appearance: Normal appearance. She is not ill-appearing, toxic-appearing or diaphoretic.   HENT:      Head: Normocephalic and atraumatic.      Jaw: There is normal jaw occlusion.      Right Ear: Hearing, tympanic membrane, ear canal and external ear normal.      Left Ear: Hearing, tympanic membrane, ear canal and external ear normal.      Nose: Nose normal.      Mouth/Throat:      Lips: Pink.      Pharynx: Oropharynx is clear. Uvula midline.   Eyes:      General: Lids are normal. Vision grossly intact. Gaze aligned appropriately.      Extraocular Movements: Extraocular movements intact.      Conjunctiva/sclera: Conjunctivae normal.      Pupils: Pupils are equal, round, and reactive to light.   Cardiovascular:      Rate and Rhythm: Normal rate and regular rhythm.      Pulses: Normal pulses.           Carotid pulses are 2+ on the right side and 2+ on the left side.       Radial pulses are 2+ on the right side " and 2+ on the left side.        Dorsalis pedis pulses are 2+ on the right side and 2+ on the left side.        Posterior tibial pulses are 2+ on the right side and 2+ on the left side.      Heart sounds: Normal heart sounds, S1 normal and S2 normal. No murmur heard.  Pulmonary:      Effort: Pulmonary effort is normal.      Breath sounds: Normal breath sounds and air entry.   Abdominal:      General: Abdomen is flat. Bowel sounds are normal. There is no distension or abdominal bruit.      Palpations: Abdomen is soft.      Tenderness: There is no abdominal tenderness.   Musculoskeletal:         General: Normal range of motion.      Right shoulder: Normal.      Cervical back: Full passive range of motion without pain, normal range of motion and neck supple. Spasms (Right trapezius) and tenderness (Right trapezius upon palpation) present.      Right lower leg: No edema.      Left lower leg: No edema.   Skin:     General: Skin is warm and dry.      Capillary Refill: Capillary refill takes less than 2 seconds.      Coloration: Skin is not cyanotic or pale.      Findings: No bruising, erythema or rash.   Neurological:      General: No focal deficit present.      Mental Status: She is alert and oriented to person, place, and time. Mental status is at baseline.      GCS: GCS eye subscore is 4. GCS verbal subscore is 5. GCS motor subscore is 6.      Cranial Nerves: Cranial nerves 2-12 are intact. No cranial nerve deficit.      Sensory: Sensation is intact. No sensory deficit.      Motor: Motor function is intact. No weakness.      Coordination: Coordination is intact. Coordination normal.      Gait: Gait is intact. Gait normal.      Deep Tendon Reflexes: Reflexes normal.   Psychiatric:         Attention and Perception: Attention and perception normal.         Mood and Affect: Mood and affect normal.         Speech: Speech normal.         Behavior: Behavior normal. Behavior is cooperative.         Thought Content: Thought  content normal.         Cognition and Memory: Cognition and memory normal.         Judgment: Judgment normal.                 Assessment and Plan   Diagnoses and all orders for this visit:    1. Well adult exam (Primary)    2. Acute pain of right shoulder  -     meloxicam (Mobic) 7.5 MG tablet; Take 1 tablet by mouth Daily for 7 days.  Dispense: 7 tablet; Refill: 0  -     cyclobenzaprine (FLEXERIL) 5 MG tablet; Take 1 tablet by mouth 2 (Two) Times a Day As Needed for Muscle Spasms.  Dispense: 20 tablet; Refill: 0    3. Spasm of right trapezius muscle  -     meloxicam (Mobic) 7.5 MG tablet; Take 1 tablet by mouth Daily for 7 days.  Dispense: 7 tablet; Refill: 0  -     cyclobenzaprine (FLEXERIL) 5 MG tablet; Take 1 tablet by mouth 2 (Two) Times a Day As Needed for Muscle Spasms.  Dispense: 20 tablet; Refill: 0    4. Screening for depression    5. Tobacco dependence due to cigarettes    6. Elevated blood pressure reading with diagnosis of hypertension  -     lisinopril (PRINIVIL,ZESTRIL) 2.5 MG tablet; Take 1 tablet by mouth Daily.  Dispense: 90 tablet; Refill: 0    7. Screening for colon cancer  -     Ambulatory Referral For Screening Colonoscopy    Well adult exam  -Screened for depression, denies feeling down.    -Discussed and recommended age-appropriate immunizations.    -Discussed healthy eating habits and incorporating routine daily exercise.    -Discussed and recommended routine dental and eye exams.    -Placed referral for screening colonoscopy.    -Recommended routine Pap exam.  Patient states she will set up Pap exam with Allison in our office.    -Lung cancer screening on February 18, 2025 benign.    -Screening mammogram on February 18, 2025 negative.    Elevated blood-pressure reading with diagnosis of hypertension.  -Blood pressure mildly elevated.  On metoprolol and hydrochlorothiazide.  -Adding lisinopril 2.5 mg daily.  -Instructed patient to monitor blood pressure at home daily, office for blood  pressure readings greater than 150/90.    Right shoulder pain  -Denies trauma or injury to affected area.  -Treating right trapezius muscle spasm.  -Treating with meloxicam and Flexeril as needed.  Instructed to not drive while taking muscle relaxer.  Instructed to not take over-the-counter NSAIDs such as ibuprofen/Aleve while taking meloxicam.  -Instructed to take over-the-counter Tylenol as needed.  -Instructed to apply ice to affected area 2-3 times a day for 20 minutes at a time.    Tobacco dependence due to cigarettes  -Discussed associated risk such as lung disease, heart disease, cancer, etc.  -Advised patient to stop cigarette smoking.    -Labs up-to-date.    -Discussed ER red flags.  -Instructed patient to follow-up with me in 4 weeks for hypertension.    Follow Up   Return in about 4 weeks (around 4/17/2025) for Recheck.    There are no Patient Instructions on file for this visit.

## 2025-04-12 DIAGNOSIS — Z76.0 MEDICATION REFILL: ICD-10-CM

## 2025-04-12 DIAGNOSIS — I10 ELEVATED BLOOD PRESSURE READING WITH DIAGNOSIS OF HYPERTENSION: ICD-10-CM

## 2025-04-14 RX ORDER — HYDROCHLOROTHIAZIDE 12.5 MG/1
12.5 TABLET ORAL DAILY
Qty: 90 TABLET | Refills: 0 | Status: SHIPPED | OUTPATIENT
Start: 2025-04-14

## 2025-04-14 NOTE — TELEPHONE ENCOUNTER
Rx Refill Note  Requested Prescriptions     Pending Prescriptions Disp Refills    hydroCHLOROthiazide 12.5 MG tablet [Pharmacy Med Name: hydroCHLOROthiazide 12.5 MG Oral Tablet] 90 tablet 0     Sig: Take 1 tablet by mouth once daily      Last office visit with prescribing clinician: 3/20/2025   Last telemedicine visit with prescribing clinician: Visit date not found   Next office visit with prescribing clinician: 4/21/2025                         Would you like a call back once the refill request has been completed: [] Yes [] No    If the office needs to give you a call back, can they leave a voicemail: [] Yes [] No    Trinidad Moya MA  04/14/25, 07:00 EDT

## 2025-05-05 DIAGNOSIS — E55.9 VITAMIN D DEFICIENCY: ICD-10-CM

## 2025-05-05 RX ORDER — ERGOCALCIFEROL 1.25 MG/1
50000 CAPSULE, LIQUID FILLED ORAL WEEKLY
Qty: 5 CAPSULE | Refills: 0 | Status: SHIPPED | OUTPATIENT
Start: 2025-05-05

## 2025-06-13 DIAGNOSIS — I10 ELEVATED BLOOD PRESSURE READING WITH DIAGNOSIS OF HYPERTENSION: ICD-10-CM

## 2025-06-13 RX ORDER — LISINOPRIL 2.5 MG/1
2.5 TABLET ORAL DAILY
Qty: 90 TABLET | Refills: 0 | Status: SHIPPED | OUTPATIENT
Start: 2025-06-13

## 2025-06-13 NOTE — TELEPHONE ENCOUNTER
Rx Refill Note  Requested Prescriptions     Pending Prescriptions Disp Refills    lisinopril (PRINIVIL,ZESTRIL) 2.5 MG tablet [Pharmacy Med Name: Lisinopril 2.5 MG Oral Tablet] 90 tablet 0     Sig: Take 1 tablet by mouth once daily      Last office visit with prescribing clinician: 3/20/2025   Last telemedicine visit with prescribing clinician: Visit date not found   Next office visit with prescribing clinician: Visit date not found                         Would you like a call back once the refill request has been completed: [] Yes [] No    If the office needs to give you a call back, can they leave a voicemail: [] Yes [] No    Trinidad Moya MA  06/13/25, 09:58 EDT

## 2025-06-13 NOTE — TELEPHONE ENCOUNTER
HUB IS INSTRUCTED TO RELAY BELOW MESSAGE       MB FULL IF PT CALLS OFFICE  SCHEDULE SUKHJINDER WITH [facility] ONLY NO OTHER PROVIDER FOR HTN SCHEDULE NEXT AVAILABLE THANK YOU

## 2025-06-14 DIAGNOSIS — I10 ELEVATED BLOOD PRESSURE READING WITH DIAGNOSIS OF HYPERTENSION: ICD-10-CM

## 2025-06-14 DIAGNOSIS — Z76.0 MEDICATION REFILL: ICD-10-CM

## 2025-06-16 RX ORDER — METOPROLOL SUCCINATE 25 MG/1
25 TABLET, EXTENDED RELEASE ORAL DAILY
Qty: 30 TABLET | Refills: 0 | Status: SHIPPED | OUTPATIENT
Start: 2025-06-16

## 2025-06-16 RX ORDER — HYDROCHLOROTHIAZIDE 12.5 MG/1
12.5 TABLET ORAL DAILY
Qty: 90 TABLET | Refills: 0 | Status: SHIPPED | OUTPATIENT
Start: 2025-06-16

## 2025-06-16 NOTE — TELEPHONE ENCOUNTER
Rx Refill Note  Requested Prescriptions     Pending Prescriptions Disp Refills    hydroCHLOROthiazide 12.5 MG tablet [Pharmacy Med Name: hydroCHLOROthiazide 12.5 MG Oral Tablet] 90 tablet 0     Sig: Take 1 tablet by mouth once daily      Last office visit with prescribing clinician: 3/20/2025   Last telemedicine visit with prescribing clinician: Visit date not found   Next office visit with prescribing clinician: Visit date not found                         Would you like a call back once the refill request has been completed: [] Yes [] No    If the office needs to give you a call back, can they leave a voicemail: [] Yes [] No    Trinidad Moya MA  06/16/25, 07:51 EDT

## 2025-06-16 NOTE — TELEPHONE ENCOUNTER
Rx Refill Note  Requested Prescriptions     Pending Prescriptions Disp Refills    metoprolol succinate XL (TOPROL-XL) 25 MG 24 hr tablet [Pharmacy Med Name: Metoprolol Succinate ER 25 MG Oral Tablet Extended Release 24 Hour] 30 tablet 0     Sig: Take 1 tablet by mouth once daily      Last office visit with prescribing clinician: 1/2/2025   Last telemedicine visit with prescribing clinician: Visit date not found   Next office visit with prescribing clinician: Visit date not found                         Would you like a call back once the refill request has been completed: [] Yes [] No    If the office needs to give you a call back, can they leave a voicemail: [] Yes [] No    Trinidad Moya MA  06/16/25, 07:21 EDT